# Patient Record
Sex: MALE | Race: WHITE | Employment: FULL TIME | URBAN - METROPOLITAN AREA
[De-identification: names, ages, dates, MRNs, and addresses within clinical notes are randomized per-mention and may not be internally consistent; named-entity substitution may affect disease eponyms.]

---

## 2017-01-13 DIAGNOSIS — E29.1 HYPOGONADISM MALE: ICD-10-CM

## 2017-01-13 RX ORDER — TESTOSTERONE CYPIONATE 200 MG/ML
INJECTION INTRAMUSCULAR
Qty: 1 ML | Refills: 3 | Status: SHIPPED | OUTPATIENT
Start: 2017-01-13 | End: 2017-04-21 | Stop reason: SDUPTHER

## 2017-01-13 NOTE — TELEPHONE ENCOUNTER
Left patient message RX is ready for  Monday - Friday 8 am - 4:45pm.  When patient comes he needs a follow up for his CPE and hypogonadism.

## 2017-04-21 ENCOUNTER — OFFICE VISIT (OUTPATIENT)
Dept: FAMILY MEDICINE CLINIC | Age: 62
End: 2017-04-21

## 2017-04-21 VITALS
HEIGHT: 68 IN | RESPIRATION RATE: 16 BRPM | TEMPERATURE: 98 F | HEART RATE: 68 BPM | OXYGEN SATURATION: 98 % | DIASTOLIC BLOOD PRESSURE: 80 MMHG | SYSTOLIC BLOOD PRESSURE: 140 MMHG | BODY MASS INDEX: 31.52 KG/M2 | WEIGHT: 208 LBS

## 2017-04-21 DIAGNOSIS — E78.5 HYPERLIPIDEMIA, UNSPECIFIED HYPERLIPIDEMIA TYPE: ICD-10-CM

## 2017-04-21 DIAGNOSIS — E29.1 HYPOGONADISM MALE: ICD-10-CM

## 2017-04-21 DIAGNOSIS — Z00.00 ROUTINE MEDICAL EXAM: Primary | ICD-10-CM

## 2017-04-21 DIAGNOSIS — M79.10 MYALGIA: ICD-10-CM

## 2017-04-21 RX ORDER — ROSUVASTATIN CALCIUM 20 MG/1
20 TABLET, COATED ORAL
Qty: 90 TAB | Refills: 1 | Status: SHIPPED | OUTPATIENT
Start: 2017-04-21 | End: 2018-01-08

## 2017-04-21 RX ORDER — TESTOSTERONE CYPIONATE 200 MG/ML
INJECTION INTRAMUSCULAR
Qty: 1 ML | Refills: 5 | Status: SHIPPED | OUTPATIENT
Start: 2017-04-21 | End: 2017-05-16 | Stop reason: SDUPTHER

## 2017-04-21 NOTE — PROGRESS NOTES
HISTORY OF PRESENT ILLNESS    Bebe Hogan is a 63y.o. year old male here today for:  HLD, testosterone    Had a lot of issues with muscle aches couple months ago so stopped zocor and resolved. Testosterone doing great w/ current Tx and pleased with results. Current Outpatient Prescriptions   Medication Sig Dispense Refill    testosterone cypionate (DEPOTESTOTERONE CYPIONATE) 200 mg/mL injection INJECT 200 MG IN THE MUSCLE EVERY 4 WEEKS 1 mL 3    Syringe, Disposable, 1 mL syrg Inject testosterone as directed. 100 Syringe 1    Needle, Disp, 22 G 22 x 1 1/2 \" ndle Use as directed to inject testosterone. 100 Each 1    Safety Needles (NEEDLE, DISP, 18 G) 18 x 1 1/2 \" ndle Use as directed to inject testosterone. 100 Each 1    omeprazole (PRILOSEC OTC) 20 mg tablet Take 40 mg by mouth daily. Past Medical History:   Diagnosis Date    ACL (anterior cruciate ligament) tear 8/10/2010    Carpal tunnel syndrome, bilateral 5/13/2015    GERD (gastroesophageal reflux disease) 8/10/2010    HLD (hyperlipidemia) 8/10/2010    Knee pain, right 8/10/2010    [facility] 12/13/2011    face     Social History     Social History    Marital status:      Spouse name: N/A    Number of children: N/A    Years of education: N/A     Social History Main Topics    Smoking status: Current Some Day Smoker     Types: Cigars    Smokeless tobacco: Never Used    Alcohol use 8.4 oz/week     14 Cans of beer per week    Drug use: No    Sexual activity: Yes     Partners: Female     Birth control/ protection: None     Other Topics Concern    None     Social History Narrative     Family History   Problem Relation Age of Onset    High Cholesterol Mother     High Cholesterol Father    [place] Arthritis-rheumatoid Father     Heart Disease Father      CAD    Crohn's Disease Sister     Cancer Father      prostate in [de-identified]       ROS:  Se HPI. Good libido and energy.     Objective:  Visit Vitals    /80 (BP 1 Location: Left arm, BP Patient Position: Sitting)    Pulse 68    Temp 98 °F (36.7 °C) (Oral)    Resp 16    Ht 5' 8\" (1.727 m)    Wt 208 lb (94.3 kg)    SpO2 98%    BMI 31.63 kg/m2     GEN:  Appears stated age in NAD. HEENT: Conjunctiva/lids normal.  External ears and nose without lesions/trauma. Hearing Intact. Tongue midline. NECK: Trachea midline. Supple. Full ROM  CARDIAC:  regular rate and rhythm. no Murmur, no peripheral edema. LUNGS: lungs clear to auscultation, no accessory muscle use. MS: no clubbing/cyanosis. SKIN: Warm/dry without rash. PSYCH: Appropriate insight, Judgment. A&O x 3. Assessment/Plan:   HLD  Hypogonadism  Myalgia    Patient verbalizes understanding of evaluation and plan. Will get labs in a month and I will notify results for change to crestor. Start CoQ 10. Continue testosterone every 4 weeks.

## 2017-04-21 NOTE — PROGRESS NOTES
Patient is currently not taking the following medications and wants them removed from their list:    no    Learning Assessment (baseline): complete  Depression Screening: complete      1. Have you been to the ER, urgent care clinic since your last visit? Hospitalized since your last visit? No    2. Have you seen or consulted any other health care providers outside of the [address] [place] [address] since your last visit? Include any pap smears or colon screening.  Yes derm      Patient is due for the following immunizations:    Influenza: completed

## 2017-04-21 NOTE — MR AVS SNAPSHOT
Visit Information Date & Time Provider Department Dept. Phone Encounter #  
 4/21/2017 10:40 AM Yany Patel, [address]  [id number] Follow-up Instructions Return in about 6 months (around 10/21/2017) for testosterone, cholesterol. Your Appointments 8/1/2017 10:00 AM  
Office Visit with Albina Alba MD  
[facility]) Appt Note: 1 yr; 1 yr  
 [address] [facility], [address] Upcoming Health Maintenance Date Due Hepatitis C Screening 1955 FOBT Q 1 YEAR AGE 50-75 12/28/2007 INFLUENZA AGE 9 TO ADULT 8/1/2016 Pneumococcal 19-64 Medium Risk (1 of 1 - PPSV23) 11/21/2017* DTaP/Tdap/Td series (2 - Td) 10/16/2018 *Topic was postponed. The date shown is not the original due date. Allergies as of 4/21/2017  Review Complete On: 4/21/2017 By: Yany Patel DO Severity Noted Reaction Type Reactions Zocor [Simvastatin]  04/21/2017    Myalgia Current Immunizations  Reviewed on 12/14/2010 Name Date Influenza Vaccine PF 12/11/2013 Influenza Vaccine Split 12/14/2010 TDAP Vaccine 10/16/2008 Not reviewed this visit You Were Diagnosed With   
  
 Codes Comments Routine medical exam    -  Primary ICD-10-CM: Z00.00 ICD-9-CM: V70.0 Hypogonadism male     ICD-10-CM: E29.1 ICD-9-CM: 257.2 Hyperlipidemia, unspecified hyperlipidemia type     ICD-10-CM: E78.5 ICD-9-CM: 272.4 Myalgia     ICD-10-CM: M79.1 ICD-9-CM: 729.1 Vitals BP Pulse Temp Resp Height(growth percentile) Weight(growth percentile) 140/80 (BP 1 Location: Left arm, BP Patient Position: Sitting) 68 98 °F (36.7 °C) (Oral) 16 5' 8\" (1.727 m) 208 lb (94.3 kg) SpO2 BMI Smoking Status 98% 31.63 kg/m2 Current Some Day Smoker BMI and BSA Data Body Mass Index Body Surface Area  
 31.63 kg/m 2 2.13 m 2 Preferred Pharmacy Pharmacy Name Phone [facility] [id number] [place], [address] [phone] Your Updated Medication List  
  
   
This list is accurate as of: 4/21/17 11:08 AM.  Always use your most recent med list.  
  
  
  
  
 Needle (Disp) 22 G 22 gauge x 1 1/2\" Ndle Use as directed to inject testosterone. PriLOSEC OTC 20 mg tablet Generic drug:  omeprazole Take 40 mg by mouth daily. rosuvastatin 20 mg tablet Commonly known as:  CRESTOR Take 1 Tab by mouth nightly. Safety Needles 18 gauge x 1 1/2\" Ndle Use as directed to inject testosterone. Syringe (Disposable) 1 mL Syrg Inject testosterone as directed. testosterone cypionate 200 mg/mL injection Commonly known as:  DEPOTESTOTERONE CYPIONATE INJECT 200 MG IN THE MUSCLE EVERY 4 WEEKS Prescriptions Printed Refills  
 testosterone cypionate (DEPOTESTOTERONE CYPIONATE) 200 mg/mL injection 5 Sig: INJECT 200 MG IN THE MUSCLE EVERY 4 WEEKS Class: Print Prescriptions Sent to Pharmacy Refills  
 rosuvastatin (CRESTOR) 20 mg tablet 1 Sig: Take 1 Tab by mouth nightly. Class: Normal  
 Pharmacy: [facility] [address] [place] [address] AT [address]  #: [phone] Route: Oral  
  
Follow-up Instructions Return in about 6 months (around 10/21/2017) for testosterone, cholesterol. To-Do List   
 05/05/2017 Lab:  TESTOSTERONE, TOTAL, ADULT MALE Around 07/20/2017 Lab:  LIPID PANEL Around 07/20/2017 Lab:  METABOLIC PANEL, COMPREHENSIVE Patient Instructions Start Coenzyme Q10 (coQ 10) 100mg once or twice daily over the counter. Hypogonadism: Care Instructions Your Care Instructions Men who have hypogonadism do not make enough testosterone.  This hormone allows men to make sperm and to have normal physical male traits. The condition also is known as testosterone deficiency. It can lead to loss of sex drive, weakness, impotence, infertility, and weakened bones. Many things can cause this condition. Causes include injured testicles, certain medicines, an infection, and aging. Having a long-term health problem such as kidney or liver disease or being obese can cause it. So can surgery or radiation treatment for another health problem. It also can be present at birth. It is most often treated with testosterone hormone. You can get the hormone as a shot or through a patch or gel on the skin. Follow-up care is a key part of your treatment and safety. Be sure to make and go to all appointments, and call your doctor if you are having problems. It's also a good idea to know your test results and keep a list of the medicines you take. How can you care for yourself at home? · Take your medicines exactly as prescribed. Call your doctor if you think you are having a problem with your medicine. You will get more details on the specific medicines your doctor prescribes. · Follow your treatment plan. If you use testosterone hormones, follow your doctor's instructions. Hormones can help relieve many of the effects of this condition, such as impotence. But it may take weeks or months for your symptoms to improve. · Get plenty of exercise. And make sure to get plenty of calcium and vitamin D in your diet. Eat more dairy foods and green vegetables. They can help keep your bones from getting weak. · If you have a hard time dealing with this condition, talk to your doctor about joining a support group. Talking with others who have the same problems can help you cope. When should you call for help? Call your doctor now or seek immediate medical care if: 
· You have headaches. · You have problems with your vision. Watch closely for changes in your health, and be sure to contact your doctor if: 
· You have trouble getting or keeping an erection. · You have a loss of body hair. · You feel weak or tired a lot of the time. · Your breasts are getting larger. · You do not get better as expected. Where can you learn more? Go to http://yaritza-zulema.info/. Enter 99 495354 in the search box to learn more about \"Hypogonadism: Care Instructions. \" Current as of: July 28, 2016 Content Version: 11.2 © 6964-7268 MedPageToday. Care instructions adapted under license by [employer] (which disclaims liability or warranty for this information). If you have questions about a medical condition or this instruction, always ask your healthcare professional. [address] any warranty or liability for your use of this information. High Cholesterol: Care Instructions Your Care Instructions Cholesterol is a type of fat in your blood. It is needed for many body functions, such as making new cells. Cholesterol is made by your body. It also comes from food you eat. High cholesterol means that you have too much of the fat in your blood. This raises your risk of a heart attack and stroke. LDL and HDL are part of your total cholesterol. LDL is the \"bad\" cholesterol. High LDL can raise your risk for heart disease, heart attack, and stroke. HDL is the \"good\" cholesterol. It helps clear bad cholesterol from the body. High HDL is linked with a lower risk of heart disease, heart attack, and stroke. Your cholesterol levels help your doctor find out your risk for having a heart attack or stroke. You and your doctor can talk about whether you need to lower your risk and what treatment is best for you. A heart-healthy lifestyle along with medicines can help lower your cholesterol and your risk.  The way you choose to lower your risk will depend on how high your risk is for heart attack and stroke. It will also depend on how you feel about taking medicines. Follow-up care is a key part of your treatment and safety. Be sure to make and go to all appointments, and call your doctor if you are having problems. It's also a good idea to know your test results and keep a list of the medicines you take. How can you care for yourself at home? · Eat a variety of foods every day. Good choices include fruits, vegetables, whole grains (like oatmeal), dried beans and peas, nuts and seeds, soy products (like tofu), and fat-free or low-fat dairy products. · Replace butter, margarine, and hydrogenated or partially hydrogenated oils with olive and canola oils. (Canola oil margarine without trans fat is fine.) · Replace red meat with fish, poultry, and soy protein (like tofu). · Limit processed and packaged foods like chips, crackers, and cookies. · Bake, broil, or steam foods. Don't renee them. · Be physically active. Get at least 30 minutes of exercise on most days of the week. Walking is a good choice. You also may want to do other activities, such as running, swimming, cycling, or playing tennis or team sports. · Stay at a healthy weight or lose weight by making the changes in eating and physical activity listed above. Losing just a small amount of weight, even 5 to 10 pounds, can reduce your risk for having a heart attack or stroke. · Do not smoke. When should you call for help? Watch closely for changes in your health, and be sure to contact your doctor if: 
· You need help making lifestyle changes. · You have questions about your medicine. Where can you learn more? Go to http://yaritza-zulema.info/. Enter L965 in the search box to learn more about \"High Cholesterol: Care Instructions. \" Current as of: January 27, 2016 Content Version: 11.2 © 3634-6197 [employer]Game, Incorporated.  Care instructions adapted under license by [address] (which disclaims liability or warranty for this information). If you have questions about a medical condition or this instruction, always ask your healthcare professional. [address] any warranty or liability for your use of this information. Well Visit, Men 48 to 72: Care Instructions Your Care Instructions Physical exams can help you stay healthy. Your doctor has checked your overall health and may have suggested ways to take good care of yourself. He or she also may have recommended tests. At home, you can help prevent illness with healthy eating, regular exercise, and other steps. Follow-up care is a key part of your treatment and safety. Be sure to make and go to all appointments, and call your doctor if you are having problems. It's also a good idea to know your test results and keep a list of the medicines you take. How can you care for yourself at home? · Reach and stay at a healthy weight. This will lower your risk for many problems, such as obesity, diabetes, heart disease, and high blood pressure. · Get at least 30 minutes of exercise on most days of the week. Walking is a good choice. You also may want to do other activities, such as running, swimming, cycling, or playing tennis or team sports. · Do not smoke. Smoking can make health problems worse. If you need help quitting, talk to your doctor about stop-smoking programs and medicines. These can increase your chances of quitting for good. · Protect your skin from too much sun. When you're outdoors from 10 a.m. to 4 p.m., stay in the shade or cover up with clothing and a hat with a wide brim. Wear sunglasses that block UV rays. Even when it's cloudy, put broad-spectrum sunscreen (SPF 30 or higher) on any exposed skin. · See a dentist one or two times a year for checkups and to have your teeth cleaned. · Wear a seat belt in the car. · Limit alcohol to 2 drinks a day. Too much alcohol can cause health problems. Follow your doctor's advice about when to have certain tests. These tests can spot problems early. · Cholesterol. Your doctor will tell you how often to have this done based on your overall health and other things that can increase your risk for heart attack and stroke. · Blood pressure. Have your blood pressure checked during a routine doctor visit. Your doctor will tell you how often to check your blood pressure based on your age, your blood pressure results, and other factors. · Prostate exam. Talk to your doctor about whether you should have a blood test (called a PSA test) for prostate cancer. Experts disagree on whether men should have this test. Some experts recommend that you discuss the benefits and risks of the test with your doctor. · Diabetes. Ask your doctor whether you should have tests for diabetes. · Vision. Some experts recommend that you have yearly exams for glaucoma and other age-related eye problems starting at age 48. · Hearing. Tell your doctor if you notice any change in your hearing. You can have tests to find out how well you hear. · Colon cancer. You should begin tests for colon cancer at age 48. You may have one of several tests. Your doctor will tell you how often to have tests based on your age and risk. Risks include whether you already had a precancerous polyp removed from your colon or whether your parent, brother, sister, or child has had colon cancer. · Heart attack and stroke risk. At least every 4 to 6 years, you should have your risk for heart attack and stroke assessed. Your doctor uses factors such as your age, blood pressure, cholesterol, and whether you smoke or have diabetes to show what your risk for a heart attack or stroke is over the next 10 years. · Abdominal aortic aneurysm.  Ask your doctor whether you should have a test to check for an aneurysm. You may need a test if you ever smoked or if your parent, brother, sister, or child has had an aneurysm. When should you call for help? Watch closely for changes in your health, and be sure to contact your doctor if you have any problems or symptoms that concern you. Where can you learn more? Go to http://yaritza-zulema.info/. Enter L325 in the search box to learn more about \"Well Visit, Men 48 to 72: Care Instructions. \" Current as of: July 19, 2016 Content Version: 11.2 © 8684-6533 [employer]. Care instructions adapted under license by [employer] (which disclaims liability or warranty for this information). If you have questions about a medical condition or this instruction, always ask your healthcare professional. [address] any warranty or liability for your use of this information. Introducing [facility]! Dear Emili Rubin: 
Thank you for requesting a Pint Please account. Our records indicate that you already have an active Pint Please account. You can access your account anytime at [email or web address] [email or web address] Did you know that you can access your hospital and ER discharge instructions at any time in Pint Please? You can also review all of your test results from your hospital stay or ER visit. Additional Information If you have questions, please visit the Frequently Asked Questions section of the Pint Please website at [email or web address] Remember, Pint Please is NOT to be used for urgent needs. For medical emergencies, dial 911. Now available from your iPhone and Android! Please provide this summary of care documentation to your next provider. Your primary care clinician is listed as  Bad. If you have any questions after today's visit, please call [phone].

## 2017-04-21 NOTE — PROGRESS NOTES
Subjective: Ilan Del Toro is a 63 y.o. male presenting for annual exam and complete physical.    Patient Active Problem List   Diagnosis Code    GERD (gastroesophageal reflux disease) K21.9    HLD (hyperlipidemia) E78.5    ACL (anterior cruciate ligament) tear S83.519A    Knee pain, right M25.561    Hypogonadism male E29.1    Carpal tunnel syndrome, bilateral G56.03     Patient Active Problem List    Diagnosis Date Noted    Carpal tunnel syndrome, bilateral 05/13/2015    Hypogonadism male 03/06/2013    GERD (gastroesophageal reflux disease) 08/10/2010    HLD (hyperlipidemia) 08/10/2010    ACL (anterior cruciate ligament) tear 08/10/2010    Knee pain, right 08/10/2010     Current Outpatient Prescriptions   Medication Sig Dispense Refill    testosterone cypionate (DEPOTESTOTERONE CYPIONATE) 200 mg/mL injection INJECT 200 MG IN THE MUSCLE EVERY 4 WEEKS 1 mL 3    simvastatin (ZOCOR) 40 mg tablet take 1 tablet by mouth at bedtime 90 Tab 1    Syringe, Disposable, 1 mL syrg Inject testosterone as directed. 100 Syringe 1    Needle, Disp, 22 G 22 x 1 1/2 \" ndle Use as directed to inject testosterone. 100 Each 1    Safety Needles (NEEDLE, DISP, 18 G) 18 x 1 1/2 \" ndle Use as directed to inject testosterone. 100 Each 1    omeprazole (PRILOSEC OTC) 20 mg tablet Take 40 mg by mouth daily.        No Known Allergies  Past Medical History:   Diagnosis Date    ACL (anterior cruciate ligament) tear 8/10/2010    Carpal tunnel syndrome, bilateral 5/13/2015    GERD (gastroesophageal reflux disease) 8/10/2010    HLD (hyperlipidemia) 8/10/2010    Knee pain, right 8/10/2010    Squamous cell carcinoma ([facility] 12/13/2011    face     Past Surgical History:   Procedure Laterality Date    BIOPSY  2011    face - squamous cell     HX COLONOSCOPY  2008    f/u 2018    HX ORTHOPAEDIC  11or10 of 2008    right hand      Family History   Problem Relation Age of Onset    High Cholesterol Mother     High Cholesterol Father     Arthritis-rheumatoid Father     Heart Disease Father      CAD    Crohn's Disease Sister     Cancer Father      prostate in [de-identified]     Social History   Substance Use Topics    Smoking status: Current Some Day Smoker     Types: Cigars    Smokeless tobacco: Never Used    Alcohol use 8.4 oz/week     14 Cans of beer per week        Lab Results   Component Value Date/Time    Sodium 137 07/15/2016 09:47 AM    Potassium 4.6 07/15/2016 09:47 AM    Chloride 97 07/15/2016 09:47 AM    CO2 26 07/15/2016 09:47 AM    Anion gap 14.0 07/15/2016 09:47 AM    Glucose 83 07/15/2016 09:47 AM    BUN 15 07/15/2016 09:47 AM    Creatinine 0.8 07/15/2016 09:47 AM    BUN/Creatinine ratio 14 12/14/2010 10:03 AM    GFR est AA >59 12/14/2010 10:03 AM    GFR est non-AA >59 12/14/2010 10:03 AM    Calcium 9.4 07/15/2016 09:47 AM    Bilirubin, total 1.2 07/15/2016 09:47 AM    AST (SGOT) 25 07/15/2016 09:47 AM    Alk. phosphatase 60 07/15/2016 09:47 AM    Protein, total 7.1 07/15/2016 09:47 AM    Albumin 5.1 07/15/2016 09:47 AM    Globulin 2.0 07/15/2016 09:47 AM    A-G Ratio 2.6 07/15/2016 09:47 AM    ALT (SGPT) 26 07/15/2016 09:47 AM     Lab Results   Component Value Date/Time    Cholesterol, total 252 07/15/2016 09:47 AM    HDL Cholesterol 55 07/15/2016 09:47 AM    LDL, calculated 165 07/15/2016 09:47 AM    VLDL, calculated 33 07/15/2016 09:47 AM    Triglyceride 163 07/15/2016 09:47 AM    CHOL/HDL Ratio 4.6 08/10/2010 08:41 AM     No results found for: PSA, Scheryl Mad, PSAR3, [id number], [id number], PSALT      Review of Systems  A comprehensive review of systems was negative except for that written in the HPI. Objective:     Visit Vitals    /80 (BP 1 Location: Left arm, BP Patient Position: Sitting)    Pulse 68    Temp 98 °F (36.7 °C) (Oral)    Resp 16    Ht 5' 8\" (1.727 m)    Wt 208 lb (94.3 kg)    SpO2 98%    BMI 31.63 kg/m2     GEN: Appears stated age in NAD.    HEENT: Conjunctiva/lids WNL. External canals/nares WNL. Tongue midline. PERRL, EOMI. Hearing intact. NECK: Trachea midline. Supple, Full ROM. No thyroid masses. CARDIAC: RRR. S1S2. No Murmur. No peripheral edema. LUNGS: CTAB w/ normal effort. ABD: Soft, NT. No HSM. MS: No clubbing/cyanosis. Steady gait. Strength +5/5 all extremities with full ROM and good tone. NEURO: Sensation intact light touch. Good coordination. No focal deficits. SKIN: Warm/dry w/o rash. PSYCH: A+O x3. Appropriate judgment and insight. Assessment/Plan:   increase physical activity, follow low fat diet, follow low salt diet, routine labs ordered. ICD-10-CM ICD-9-CM    1. Routine medical exam Z00.00 V70.0    2. Hypogonadism male E29.1 257.2    3. Hyperlipidemia, unspecified hyperlipidemia type E78.5 272.4      Se other note E/M. Will notify labs plan draw in 4 weeks.

## 2017-04-21 NOTE — PATIENT INSTRUCTIONS
Start Coenzyme Q10 (coQ 10) 100mg once or twice daily over the counter. Hypogonadism: Care Instructions  Your Care Instructions  Men who have hypogonadism do not make enough testosterone. This hormone allows men to make sperm and to have normal physical male traits. The condition also is known as testosterone deficiency. It can lead to loss of sex drive, weakness, impotence, infertility, and weakened bones. Many things can cause this condition. Causes include injured testicles, certain medicines, an infection, and aging. Having a long-term health problem such as kidney or liver disease or being obese can cause it. So can surgery or radiation treatment for another health problem. It also can be present at birth. It is most often treated with testosterone hormone. You can get the hormone as a shot or through a patch or gel on the skin. Follow-up care is a key part of your treatment and safety. Be sure to make and go to all appointments, and call your doctor if you are having problems. It's also a good idea to know your test results and keep a list of the medicines you take. How can you care for yourself at home? · Take your medicines exactly as prescribed. Call your doctor if you think you are having a problem with your medicine. You will get more details on the specific medicines your doctor prescribes. · Follow your treatment plan. If you use testosterone hormones, follow your doctor's instructions. Hormones can help relieve many of the effects of this condition, such as impotence. But it may take weeks or months for your symptoms to improve. · Get plenty of exercise. And make sure to get plenty of calcium and vitamin D in your diet. Eat more dairy foods and green vegetables. They can help keep your bones from getting weak. · If you have a hard time dealing with this condition, talk to your doctor about joining a support group. Talking with others who have the same problems can help you cope.   When should you call for help? Call your doctor now or seek immediate medical care if:  · You have headaches. · You have problems with your vision. Watch closely for changes in your health, and be sure to contact your doctor if:  · You have trouble getting or keeping an erection. · You have a loss of body hair. · You feel weak or tired a lot of the time. · Your breasts are getting larger. · You do not get better as expected. Where can you learn more? Go to http://yaritza-zulema.info/. Enter 99 [id number] in the search box to learn more about \"Hypogonadism: Care Instructions. \"  Current as of: July 28, 2016  Content Version: 11.2  © 0740-7014 [employer]. Care instructions adapted under license by [employer] (which disclaims liability or warranty for this information). If you have questions about a medical condition or this instruction, always ask your healthcare professional. [place] [address] any warranty or liability for your use of this information. High Cholesterol: Care Instructions  Your Care Instructions  Cholesterol is a type of fat in your blood. It is needed for many body functions, such as making new cells. Cholesterol is made by your body. It also comes from food you eat. High cholesterol means that you have too much of the fat in your blood. This raises your risk of a heart attack and stroke. LDL and HDL are part of your total cholesterol. LDL is the \"bad\" cholesterol. High LDL can raise your risk for heart disease, heart attack, and stroke. HDL is the \"good\" cholesterol. It helps clear bad cholesterol from the body. High HDL is linked with a lower risk of heart disease, heart attack, and stroke. Your cholesterol levels help your doctor find out your risk for having a heart attack or stroke. You and your doctor can talk about whether you need to lower your risk and what treatment is best for you.   A heart-healthy lifestyle along with medicines can help lower your cholesterol and your risk. The way you choose to lower your risk will depend on how high your risk is for heart attack and stroke. It will also depend on how you feel about taking medicines. Follow-up care is a key part of your treatment and safety. Be sure to make and go to all appointments, and call your doctor if you are having problems. It's also a good idea to know your test results and keep a list of the medicines you take. How can you care for yourself at home? · Eat a variety of foods every day. Good choices include fruits, vegetables, whole grains (like oatmeal), dried beans and peas, nuts and seeds, soy products (like tofu), and fat-free or low-fat dairy products. · Replace butter, margarine, and hydrogenated or partially hydrogenated oils with olive and canola oils. (Canola oil margarine without trans fat is fine.)  · Replace red meat with fish, poultry, and soy protein (like tofu). · Limit processed and packaged foods like chips, crackers, and cookies. · Bake, broil, or steam foods. Don't renee them. · Be physically active. Get at least 30 minutes of exercise on most days of the week. Walking is a good choice. You also may want to do other activities, such as running, swimming, cycling, or playing tennis or team sports. · Stay at a healthy weight or lose weight by making the changes in eating and physical activity listed above. Losing just a small amount of weight, even 5 to 10 pounds, can reduce your risk for having a heart attack or stroke. · Do not smoke. When should you call for help? Watch closely for changes in your health, and be sure to contact your doctor if:  · You need help making lifestyle changes. · You have questions about your medicine. Where can you learn more? Go to http://yaritza-zulema.info/. Enter V378 in the search box to learn more about \"High Cholesterol: Care Instructions. \"  Current as of: January 27, 2016  Content Version: 11.2  © 6877-6508 [employer]. Care instructions adapted under license by [employer] (which disclaims liability or warranty for this information). If you have questions about a medical condition or this instruction, always ask your healthcare professional. [place] any warranty or liability for your use of this information. Well Visit, Men 48 to 72: Care Instructions  Your Care Instructions  Physical exams can help you stay healthy. Your doctor has checked your overall health and may have suggested ways to take good care of yourself. He or she also may have recommended tests. At home, you can help prevent illness with healthy eating, regular exercise, and other steps. Follow-up care is a key part of your treatment and safety. Be sure to make and go to all appointments, and call your doctor if you are having problems. It's also a good idea to know your test results and keep a list of the medicines you take. How can you care for yourself at home? · Reach and stay at a healthy weight. This will lower your risk for many problems, such as obesity, diabetes, heart disease, and high blood pressure. · Get at least 30 minutes of exercise on most days of the week. Walking is a good choice. You also may want to do other activities, such as running, swimming, cycling, or playing tennis or team sports. · Do not smoke. Smoking can make health problems worse. If you need help quitting, talk to your doctor about stop-smoking programs and medicines. These can increase your chances of quitting for good. · Protect your skin from too much sun. When you're outdoors from 10 a.m. to 4 p.m., stay in the shade or cover up with clothing and a hat with a wide brim. Wear sunglasses that block UV rays. Even when it's cloudy, put broad-spectrum sunscreen (SPF 30 or higher) on any exposed skin.   · See a dentist one or two times a year for checkups and to have your teeth cleaned. · Wear a seat belt in the car. · Limit alcohol to 2 drinks a day. Too much alcohol can cause health problems. Follow your doctor's advice about when to have certain tests. These tests can spot problems early. · Cholesterol. Your doctor will tell you how often to have this done based on your overall health and other things that can increase your risk for heart attack and stroke. · Blood pressure. Have your blood pressure checked during a routine doctor visit. Your doctor will tell you how often to check your blood pressure based on your age, your blood pressure results, and other factors. · Prostate exam. Talk to your doctor about whether you should have a blood test (called a PSA test) for prostate cancer. Experts disagree on whether men should have this test. Some experts recommend that you discuss the benefits and risks of the test with your doctor. · Diabetes. Ask your doctor whether you should have tests for diabetes. · Vision. Some experts recommend that you have yearly exams for glaucoma and other age-related eye problems starting at age 48. · Hearing. Tell your doctor if you notice any change in your hearing. You can have tests to find out how well you hear. · Colon cancer. You should begin tests for colon cancer at age 48. You may have one of several tests. Your doctor will tell you how often to have tests based on your age and risk. Risks include whether you already had a precancerous polyp removed from your colon or whether your parent, brother, sister, or child has had colon cancer. · Heart attack and stroke risk. At least every 4 to 6 years, you should have your risk for heart attack and stroke assessed. Your doctor uses factors such as your age, blood pressure, cholesterol, and whether you smoke or have diabetes to show what your risk for a heart attack or stroke is over the next 10 years. · Abdominal aortic aneurysm.  Ask your doctor whether you should have a test to check for an aneurysm. You may need a test if you ever smoked or if your parent, brother, sister, or child has had an aneurysm. When should you call for help? Watch closely for changes in your health, and be sure to contact your doctor if you have any problems or symptoms that concern you. Where can you learn more? Go to http://yaritza-zulema.info/. Enter A050 in the search box to learn more about \"Well Visit, Men 48 to 72: Care Instructions. \"  Current as of: July 19, 2016  Content Version: 11.2  © 0516-9086 [employer]. Care instructions adapted under license by [employer] (which disclaims liability or warranty for this information). If you have questions about a medical condition or this instruction, always ask your healthcare professional. [address] any warranty or liability for your use of this information.

## 2017-05-05 DIAGNOSIS — E29.1 HYPOGONADISM MALE: ICD-10-CM

## 2017-05-17 ENCOUNTER — TELEPHONE (OUTPATIENT)
Dept: FAMILY MEDICINE CLINIC | Age: 62
End: 2017-05-17

## 2017-06-19 DIAGNOSIS — E29.1 HYPOGONADISM MALE: ICD-10-CM

## 2017-06-19 RX ORDER — TESTOSTERONE CYPIONATE 200 MG/ML
INJECTION INTRAMUSCULAR
Qty: 1 ML | Refills: 5 | Status: SHIPPED | OUTPATIENT
Start: 2017-06-19 | End: 2017-07-19 | Stop reason: SDUPTHER

## 2017-07-19 DIAGNOSIS — E29.1 HYPOGONADISM MALE: ICD-10-CM

## 2017-07-20 DIAGNOSIS — E78.5 HYPERLIPIDEMIA, UNSPECIFIED HYPERLIPIDEMIA TYPE: ICD-10-CM

## 2017-07-24 RX ORDER — TESTOSTERONE CYPIONATE 200 MG/ML
INJECTION INTRAMUSCULAR
Qty: 1 ML | Refills: 5 | Status: SHIPPED | OUTPATIENT
Start: 2017-07-24 | End: 2018-01-03 | Stop reason: SDUPTHER

## 2017-07-26 LAB
A-G RATIO,AGRAT: 2.2 RATIO (ref 1.1–2.6)
ALBUMIN SERPL-MCNC: 4.7 G/DL (ref 3.5–5)
ALP SERPL-CCNC: 61 U/L (ref 25–115)
ALT SERPL-CCNC: 38 U/L (ref 5–40)
ANION GAP SERPL CALC-SCNC: 22 MMOL/L
AST SERPL W P-5'-P-CCNC: 32 U/L (ref 10–37)
BILIRUB SERPL-MCNC: 1.1 MG/DL (ref 0.2–1.2)
BUN SERPL-MCNC: 14 MG/DL (ref 6–22)
CALCIUM SERPL-MCNC: 9.1 MG/DL (ref 8.4–10.4)
CHLORIDE SERPL-SCNC: 88 MMOL/L (ref 98–110)
CHOLEST SERPL-MCNC: 261 MG/DL (ref 110–200)
CO2 SERPL-SCNC: 21 MMOL/L (ref 20–32)
CREAT SERPL-MCNC: 1.1 MG/DL (ref 0.5–1.2)
GFRAA, 66117: >60
GFRNA, 66118: >60
GLOBULIN,GLOB: 2.1 G/DL (ref 2–4)
GLUCOSE SERPL-MCNC: 84 MG/DL (ref 65–99)
HDLC SERPL-MCNC: 47 MG/DL (ref 40–59)
LDLC SERPL CALC-MCNC: 152 MG/DL (ref 50–99)
POTASSIUM SERPL-SCNC: 4.5 MMOL/L (ref 3.5–5.5)
PROT SERPL-MCNC: 6.8 G/DL (ref 6.4–8.3)
SODIUM SERPL-SCNC: 131 MMOL/L (ref 133–145)
TESTOSTERONE, SERUM (TOTAL) 500912: 144 NG/DL (ref 348–1197)
TRIGL SERPL-MCNC: 307 MG/DL (ref 40–149)
VLDLC SERPL CALC-MCNC: 61 MG/DL (ref 8–30)

## 2018-01-02 DIAGNOSIS — E29.1 HYPOGONADISM MALE: ICD-10-CM

## 2018-01-02 DIAGNOSIS — E29.1 HYPOGONADISM MALE: Primary | ICD-10-CM

## 2018-01-02 RX ORDER — TESTOSTERONE CYPIONATE 200 MG/ML
INJECTION INTRAMUSCULAR
Qty: 1 ML | Refills: 5 | OUTPATIENT
Start: 2018-01-02

## 2018-01-02 NOTE — TELEPHONE ENCOUNTER
Requested Prescriptions     Pending Prescriptions Disp Refills    testosterone cypionate (DEPOTESTOTERONE CYPIONATE) 200 mg/mL injection 1 mL 5     Sig: INJECT 200 MG IN THE MUSCLE EVERY 4 WEEKS      F/u scheduled for 01/08/2018 with Uziel Phan

## 2018-01-02 NOTE — TELEPHONE ENCOUNTER
Spoke with patient informing him that no one in office prescribed testosterone and he will be sent to endocrinology.  Patient would like the referral.

## 2018-01-03 ENCOUNTER — TELEPHONE (OUTPATIENT)
Dept: ORTHOPEDIC SURGERY | Age: 63
End: 2018-01-03

## 2018-01-03 DIAGNOSIS — E29.1 HYPOGONADISM MALE: ICD-10-CM

## 2018-01-03 RX ORDER — TESTOSTERONE CYPIONATE 200 MG/ML
INJECTION INTRAMUSCULAR
Qty: 3 ML | Refills: 1 | OUTPATIENT
Start: 2018-01-03

## 2018-01-03 NOTE — PROGRESS NOTES
I received a message the patient needs a refill of testosterone cypionate to allow him to continue treatment until his appointment with the endocrinologist in June. I sent in 3 mL with a refill to allow a six-month supply, so he can have the medication until his appointment in June.

## 2018-01-08 ENCOUNTER — OFFICE VISIT (OUTPATIENT)
Dept: FAMILY MEDICINE CLINIC | Age: 63
End: 2018-01-08

## 2018-01-08 VITALS
OXYGEN SATURATION: 97 % | HEIGHT: 68 IN | HEART RATE: 75 BPM | SYSTOLIC BLOOD PRESSURE: 168 MMHG | WEIGHT: 223 LBS | RESPIRATION RATE: 16 BRPM | TEMPERATURE: 97.4 F | BODY MASS INDEX: 33.8 KG/M2 | DIASTOLIC BLOOD PRESSURE: 112 MMHG

## 2018-01-08 DIAGNOSIS — Z11.59 ENCOUNTER FOR HEPATITIS C SCREENING TEST FOR LOW RISK PATIENT: ICD-10-CM

## 2018-01-08 DIAGNOSIS — R03.0 ELEVATED BLOOD PRESSURE READING: ICD-10-CM

## 2018-01-08 DIAGNOSIS — E29.1 HYPOGONADISM MALE: ICD-10-CM

## 2018-01-08 DIAGNOSIS — Z23 ENCOUNTER FOR IMMUNIZATION: ICD-10-CM

## 2018-01-08 DIAGNOSIS — E78.5 HYPERLIPIDEMIA, UNSPECIFIED HYPERLIPIDEMIA TYPE: Primary | ICD-10-CM

## 2018-01-08 NOTE — MR AVS SNAPSHOT
Visit Information Date & Time Provider Department Dept. Phone Encounter #  
 1/8/2018  3:15 PM Kimberley Doe NP [facility] [phone] [id number] Upcoming Health Maintenance Date Due Hepatitis C Screening 1955 FOBT Q 1 YEAR AGE 50-75 12/28/2007 Influenza Age 5 to Adult 8/1/2017 ZOSTER VACCINE AGE 60> 10/28/2017 DTaP/Tdap/Td series (2 - Td) 10/16/2018 Allergies as of 1/8/2018  Review Complete On: 1/8/2018 By: Kimberley Doe NP Severity Noted Reaction Type Reactions Zocor [Simvastatin]  04/21/2017    Myalgia Current Immunizations  Reviewed on 1/8/2018 Name Date Influenza Vaccine PF 12/11/2013 Influenza Vaccine Split 12/14/2010 TDAP Vaccine 10/16/2008 Reviewed by Kimberley Doe NP on 1/8/2018 at  3:29 PM  
You Were Diagnosed With   
  
 Codes Comments Hyperlipidemia, unspecified hyperlipidemia type    -  Primary ICD-10-CM: E78.5 ICD-9-CM: 272.4 Elevated blood pressure reading     ICD-10-CM: R03.0 ICD-9-CM: 796.2 Encounter for immunization     ICD-10-CM: P95 ICD-9-CM: V03.89 Vitals BP Pulse Temp Resp Height(growth percentile) Weight(growth percentile) (!) 168/112 (BP 1 Location: Left arm, BP Patient Position: Sitting) 75 97.4 °F (36.3 °C) (Oral) 16 5' 8\" (1.727 m) 223 lb (101.2 kg) SpO2 BMI Smoking Status 97% 33.91 kg/m2 Current Some Day Smoker Vitals History BMI and BSA Data Body Mass Index Body Surface Area  
 33.91 kg/m 2 2.2 m 2 Preferred Pharmacy Pharmacy Name Phone [phone] [facility] [address], [address] [phone] Your Updated Medication List  
  
   
This list is accurate as of: 1/8/18  4:15 PM.  Always use your most recent med list.  
  
  
  
  
 PriLOSEC OTC 20 mg tablet Generic drug:  omeprazole Take 40 mg by mouth daily. Safety Needles 18 gauge x 1 1/2\" Ndle Use as directed to inject testosterone. Syringe (Disposable) 1 mL Syrg Inject testosterone as directed. testosterone cypionate 200 mg/mL injection Commonly known as:  DEPOTESTOTERONE CYPIONATE INJECT 200 MG IN THE MUSCLE EVERY 4 WEEKS  
  
 varicella zoster vaccine live 19,400 unit/0.65 mL Susr injection Commonly known as:  ZOSTAVAX  
1 Vial by SubCUTAneous route once for 1 dose. Prescriptions Printed Refills  
 varicella zoster vaccine live (ZOSTAVAX) 19,400 unit/0.65 mL susr injection 0 Si Vial by SubCUTAneous route once for 1 dose. Class: Print Route: SubCUTAneous To-Do List   
 2018 Lab:  LIPID PANEL   
  
 2018 Lab:  METABOLIC PANEL, COMPREHENSIVE Patient Instructions Monitor blood pressure: consider :Losartan 100/25 Introducing [facility]! Dear Ruchi Rosenberg: 
Thank you for requesting a Vionic account. Our records indicate that you already have an active Vionic account. You can access your account anytime at [email or web address] Did you know that you can access your hospital and ER discharge instructions at any time in Vionic? You can also review all of your test results from your hospital stay or ER visit. Additional Information If you have questions, please visit the Frequently Asked Questions section of the Vionic website at [email or web address] Remember, Vionic is NOT to be used for urgent needs. For medical emergencies, dial 911. Now available from your iPhone and Android! Please provide this summary of care documentation to your next provider. Your primary care clinician is listed as Tracy Maxwell. If you have any questions after today's visit, please call [phone].

## 2018-01-08 NOTE — PROGRESS NOTES
HISTORY OF PRESENT ILLNESS  Peter Hicks is a 63 y.o. male. Patient presents today regarding Testosterone. HPI  Pt states he wants an explanation as to why he is being referred to endocrinology for testosterone management when it has been done her for years. Pt stopped Crestor due to cost.  Pt states he has never had a problem with his blood pressure before. Review of Systems   Constitutional: Negative. Respiratory: Negative. Cardiovascular: Negative. Genitourinary: Negative. Visit Vitals    BP (!) 168/103 (BP 1 Location: Left arm, BP Patient Position: Sitting)    Pulse 75    Temp 97.4 °F (36.3 °C) (Oral)    Resp 16    Ht 5' 8\" (1.727 m)    Wt 223 lb (101.2 kg)    SpO2 97%    BMI 33.91 kg/m2       Physical Exam   Constitutional: He appears well-developed. No distress. Cardiovascular: Normal rate, regular rhythm and normal heart sounds. No murmur heard. Pulmonary/Chest: Effort normal and breath sounds normal. No respiratory distress. He has no wheezes. He has no rales. ASSESSMENT and PLAN    ICD-10-CM ICD-9-CM    1. Hyperlipidemia, unspecified hyperlipidemia type C96.2 490.0 METABOLIC PANEL, COMPREHENSIVE      LIPID PANEL      METABOLIC PANEL, COMPREHENSIVE      LIPID PANEL   2. Elevated blood pressure reading R03.0 796.2    3. Encounter for immunization Z23 V03.89 varicella zoster vaccine live (ZOSTAVAX) 19,400 unit/0.65 mL susr injection   4. Encounter for hepatitis C screening test for low risk patient Z11.59 V73.89 HCV RNA CAIN QUALITATIVE      HCV RNA CAIN QUALITATIVE   5. Hypogonadism male E29.1 257.2 TESTOSTERONE, FREE & TOTAL      TESTOSTERONE, FREE & TOTAL     PLAN:  I spent greater than 50 minutes face to face discussion reviewing his labs, risk and benefits of testosterone replacement therapy. I could not explain to patient why female testosterone levels were the one being ordered. He asked me to order the original testosterone levels.  We discussed his blood pressure and the risk of that not being controlled and that it is possible that the testosterone is driving it up. We also discussed his not taking his cholesterol modifications, the risk and benefits of those. He has some Simvastatin at home. We discussed immunization recommendations and Pt agreed to the shingles vaccine so script was given to him. We discussed screening recommendations and patient agreed to the Hep C screening. Pt is to monitor his blood pressure. We talked about Losartan 100/25mg, starting this to control his BP. Pt was asked to RTC in 6 mos for chronic care and fasting labs.     Pt given after visit summary

## 2018-01-08 NOTE — PROGRESS NOTES
1. Have you been to the ER, urgent care clinic since your last visit? Hospitalized since your last visit? No    2. Have you seen or consulted any other health care providers outside of the [address] [place] [address] since your last visit? Include any pap smears or colon screening.  No

## 2018-01-09 LAB
A-G RATIO,AGRAT: 1.6 RATIO (ref 1.1–2.6)
ALBUMIN SERPL-MCNC: 4.6 G/DL (ref 3.5–5)
ALP SERPL-CCNC: 62 U/L (ref 40–125)
ALT SERPL-CCNC: 43 U/L (ref 5–40)
ANION GAP SERPL CALC-SCNC: 17 MMOL/L
AST SERPL W P-5'-P-CCNC: 33 U/L (ref 10–37)
BILIRUB SERPL-MCNC: 0.9 MG/DL (ref 0.2–1.2)
BUN SERPL-MCNC: 15 MG/DL (ref 6–22)
CALCIUM SERPL-MCNC: 9.2 MG/DL (ref 8.4–10.4)
CHLORIDE SERPL-SCNC: 96 MMOL/L (ref 98–110)
CHOLEST SERPL-MCNC: 298 MG/DL (ref 110–200)
CO2 SERPL-SCNC: 26 MMOL/L (ref 20–32)
CREAT SERPL-MCNC: 0.9 MG/DL (ref 0.8–1.6)
GFRAA, 66117: >60
GFRNA, 66118: >60
GLOBULIN,GLOB: 2.8 G/DL (ref 2–4)
GLUCOSE SERPL-MCNC: 86 MG/DL (ref 70–99)
HDLC SERPL-MCNC: 50 MG/DL (ref 40–59)
LDL, DIRECT,DLDL: 183 MG/DL (ref 50–99)
LDLC SERPL CALC-MCNC: ABNORMAL MG/DL (ref 50–99)
POTASSIUM SERPL-SCNC: 4.5 MMOL/L (ref 3.5–5.5)
PROT SERPL-MCNC: 7.4 G/DL (ref 6.2–8.1)
SODIUM SERPL-SCNC: 139 MMOL/L (ref 133–145)
TRIGL SERPL-MCNC: 415 MG/DL (ref 40–149)
VLDLC SERPL CALC-MCNC: 83 MG/DL (ref 8–30)

## 2018-01-09 NOTE — PROGRESS NOTES
Please advise Pt that his kidney and liver functions are fine. His triglycerides are way too high at 415, he needs to work on his diet, eat more foods rich in Omega 3 and Niacin, walk more, eat less foods that are fried and fatty. I would recommend that he restart his cholesterol medication. We should recheck these again in 6 months, fasting.

## 2018-01-10 ENCOUNTER — TELEPHONE (OUTPATIENT)
Dept: FAMILY MEDICINE CLINIC | Age: 63
End: 2018-01-10

## 2018-01-10 NOTE — TELEPHONE ENCOUNTER
----- Message from Anoop Rojas NP sent at 1/9/2018  4:34 PM EST -----  Please advise Pt that his kidney and liver functions are fine. His triglycerides are way too high at 415, he needs to work on his diet, eat more foods rich in Omega 3 and Niacin, walk more, eat less foods that are fried and fatty. I would recommend that he restart his cholesterol medication. We should recheck these again in 6 months, fasting.

## 2018-01-10 NOTE — TELEPHONE ENCOUNTER
Spoke with patient informing him of labs reviewed by samantha. I let him know to call and give us his weekly bp log. Patient stated that Claritza Constantindanna would try to expedite his endocrine referral. I informed him that I would talk with referral coordinator and see what can be done. I called him back [facility] informing him that all paperwork was sent to eileen office and if he would like to go ahead and schedule or call everyday to see if he can be seen earlier he could. I also let him know that he can pick a new endo doc and give us a call so we can put in referral to one he would like to see.

## 2018-01-11 DIAGNOSIS — E78.2 MIXED HYPERLIPIDEMIA: Primary | ICD-10-CM

## 2018-01-11 LAB — HCV RNA QUAL PCR,9951751: NEGATIVE

## 2018-01-11 RX ORDER — ATORVASTATIN CALCIUM 20 MG/1
20 TABLET, FILM COATED ORAL DAILY
Qty: 90 TAB | Refills: 1 | Status: SHIPPED | OUTPATIENT
Start: 2018-01-11 | End: 2018-03-12

## 2018-01-12 LAB
% FREE TESTOSTERONE: 3.52 %
FREE TESTOSTERONE,DIRECT, 144981: 4.58 NG/DL
TESTOSTERONE: 130 NG/DL

## 2018-03-09 ENCOUNTER — TELEPHONE (OUTPATIENT)
Dept: FAMILY MEDICINE CLINIC | Age: 63
End: 2018-03-09

## 2018-03-09 DIAGNOSIS — I10 ESSENTIAL HYPERTENSION: Primary | ICD-10-CM

## 2018-03-09 RX ORDER — LOSARTAN POTASSIUM AND HYDROCHLOROTHIAZIDE 25; 100 MG/1; MG/1
1 TABLET ORAL DAILY
Qty: 30 TAB | Refills: 0 | Status: SHIPPED | OUTPATIENT
Start: 2018-03-09 | End: 2018-03-09 | Stop reason: SDUPTHER

## 2018-03-09 RX ORDER — LOSARTAN POTASSIUM AND HYDROCHLOROTHIAZIDE 25; 100 MG/1; MG/1
1 TABLET ORAL DAILY
Qty: 30 TAB | Refills: 0 | Status: SHIPPED | OUTPATIENT
Start: 2018-03-09 | End: 2018-04-04 | Stop reason: SDUPTHER

## 2018-03-09 NOTE — TELEPHONE ENCOUNTER
Spoke with patients wife (emergency contact) and she requested we send something in for the weekend, patient has an appointment Monday and agreed to take patient to hospital if symptoms worsen.

## 2018-03-09 NOTE — TELEPHONE ENCOUNTER
Patient's wife called in to report patient's blood pressure of 210/107 last night. Patient's wife states he is reporting symptoms of not feeling well with non-descriptive symptoms. Patient's wife said patient is currently at work. Advised patient's wife to have him go to the emergency room. Patient's wife got patient on his cell phone while I was on the line. Patient's wife relayed message to patient. Patient refusing to go to the emergency room at this time. Patient states his blood pressure this morning was 135/91. Patient's wife advised to continue monitoring patient's blood pressure, and if it elevates that high again with relatable symptoms, present to the emergency room for evaluation.

## 2018-03-12 ENCOUNTER — OFFICE VISIT (OUTPATIENT)
Dept: FAMILY MEDICINE CLINIC | Age: 63
End: 2018-03-12

## 2018-03-12 VITALS
DIASTOLIC BLOOD PRESSURE: 90 MMHG | TEMPERATURE: 97.9 F | HEIGHT: 68 IN | BODY MASS INDEX: 33.95 KG/M2 | HEART RATE: 77 BPM | WEIGHT: 224 LBS | RESPIRATION RATE: 16 BRPM | OXYGEN SATURATION: 95 % | SYSTOLIC BLOOD PRESSURE: 158 MMHG

## 2018-03-12 DIAGNOSIS — E78.00 PURE HYPERCHOLESTEROLEMIA: Primary | ICD-10-CM

## 2018-03-12 DIAGNOSIS — I10 ESSENTIAL HYPERTENSION: ICD-10-CM

## 2018-03-12 RX ORDER — SIMVASTATIN 40 MG/1
40 TABLET, FILM COATED ORAL
COMMUNITY
End: 2018-03-12

## 2018-03-12 RX ORDER — ASPIRIN 81 MG/1
TABLET ORAL DAILY
COMMUNITY
End: 2019-06-17

## 2018-03-12 RX ORDER — SIMVASTATIN 40 MG/1
TABLET, FILM COATED ORAL
Qty: 90 TAB | Refills: 1 | Status: SHIPPED | OUTPATIENT
Start: 2018-03-12 | End: 2019-01-18 | Stop reason: SDUPTHER

## 2018-03-12 NOTE — MR AVS SNAPSHOT
[facility] 
 
 
 [address] [address] 
[phone] Patient: Luma Wahl 
MRN:  [id number] Visit Information Date & Time Provider Department Dept. Phone Encounter #  
 3/12/2018 12:40 PM Perla Cr, [address] [phone] [id number] Follow-up Instructions Return in about 3 months (around 2018) for htn. Upcoming Health Maintenance Date Due FOBT Q 1 YEAR AGE 50-75 2007 Influenza Age 5 to Adult 2017 DTaP/Tdap/Td series (2 - Td) 10/16/2018 Allergies as of 3/12/2018  Review Complete On: 3/12/2018 By: Perla Cr MD  
  
 Severity Noted Reaction Type Reactions Zocor [Simvastatin]  2017    Myalgia Current Immunizations  Reviewed on 2018 Name Date Influenza Vaccine PF 2013 Influenza Vaccine Split 2010 TDAP Vaccine 10/16/2008 Not reviewed this visit You Were Diagnosed With   
  
 Codes Comments Pure hypercholesterolemia    -  Primary ICD-10-CM: E78.00 ICD-9-CM: 272.0 Essential hypertension     ICD-10-CM: I10 
ICD-9-CM: 401.9 Vitals BP Pulse Temp Resp Height(growth percentile) Weight(growth percentile) 158/90 77 97.9 °F (36.6 °C) (Oral) 16 5' 8\" (1.727 m) 224 lb (101.6 kg) SpO2 BMI Smoking Status 95% 34.06 kg/m2 Current Some Day Smoker Vitals History BMI and BSA Data Body Mass Index Body Surface Area 34.06 kg/m 2 2.21 m 2 Preferred Pharmacy Pharmacy Name Phone [phone] [address] [place], [address] [facility] [address] AT [address] [phone] Your Updated Medication List  
  
   
This list is accurate as of 3/12/18  1:36 PM.  Always use your most recent med list.  
  
  
  
  
 aspirin delayed-release 81 mg tablet Take  by mouth daily. losartan-hydroCHLOROthiazide 100-25 mg per tablet Commonly known as:  HYZAAR Take 1 Tab by mouth daily. PriLOSEC OTC 20 mg tablet Generic drug:  omeprazole Take 20 mg by mouth daily. Safety Needles 18 gauge x 1 1/2\" Ndle Use as directed to inject testosterone. simvastatin 40 mg tablet Commonly known as:  ZOCOR  
take 1 tablet by mouth at bedtime Syringe (Disposable) 1 mL Syrg Inject testosterone as directed. testosterone cypionate 200 mg/mL injection Commonly known as:  DEPOTESTOTERONE CYPIONATE INJECT 200 MG IN THE MUSCLE EVERY 4 WEEKS Prescriptions Printed Refills  
 simvastatin (ZOCOR) 40 mg tablet 1 Sig: take 1 tablet by mouth at bedtime Class: Print Follow-up Instructions Return in about 3 months (around 6/12/2018) for htn. Patient Instructions Low Sodium Diet (2,000 Milligram): Care Instructions Your Care Instructions Too much sodium causes your body to hold on to extra water. This can raise your blood pressure and force your heart and kidneys to work harder. In very serious cases, this could cause you to be put in the hospital. It might even be life-threatening. By limiting sodium, you will feel better and lower your risk of serious problems. The most common source of sodium is salt. People get most of the salt in their diet from canned, prepared, and packaged foods. Fast food and restaurant meals also are very high in sodium. Your doctor will probably limit your sodium to less than 2,000 milligrams (mg) a day. This limit counts all the sodium in prepared and packaged foods and any salt you add to your food. Follow-up care is a key part of your treatment and safety. Be sure to make and go to all appointments, and call your doctor if you are having problems. It's also a good idea to know your test results and keep a list of the medicines you take. How can you care for yourself at home? Read food labels · Read labels on cans and food packages. The labels tell you how much sodium is in each serving. Make sure that you look at the serving size. If you eat more than the serving size, you have eaten more sodium. · Food labels also tell you the Percent Daily Value for sodium. Choose products with low Percent Daily Values for sodium. · Be aware that sodium can come in forms other than salt, including monosodium glutamate (MSG), sodium citrate, and sodium bicarbonate (baking soda). MSG is often added to Asian food. When you eat out, you can sometimes ask for food without MSG or added salt. Buy low-sodium foods · Buy foods that are labeled \"unsalted\" (no salt added), \"sodium-free\" (less than 5 mg of sodium per serving), or \"low-sodium\" (less than 140 mg of sodium per serving). Foods labeled \"reduced-sodium\" and \"light sodium\" may still have too much sodium. Be sure to read the label to see how much sodium you are getting. · Buy fresh vegetables, or frozen vegetables without added sauces. Buy low-sodium versions of canned vegetables, soups, and other canned goods. Prepare low-sodium meals · Cut back on the amount of salt you use in cooking. This will help you adjust to the taste. Do not add salt after cooking. One teaspoon of salt has about 2,300 mg of sodium. · Take the salt shaker off the table. · Flavor your food with garlic, lemon juice, onion, vinegar, herbs, and spices. Do not use soy sauce, lite soy sauce, steak sauce, onion salt, garlic salt, celery salt, mustard, or ketchup on your food. · Use low-sodium salad dressings, sauces, and ketchup. Or make your own salad dressings and sauces without adding salt. · Use less salt (or none) when recipes call for it. You can often use half the salt a recipe calls for without losing flavor. Other foods such as rice, pasta, and grains do not need added salt. · Rinse canned vegetables, and cook them in fresh water. This removes some-but not all-of the salt. · Avoid water that is naturally high in sodium or that has been treated with water softeners, which add sodium. Call your local water company to find out the sodium content of your water supply. If you buy bottled water, read the label and choose a sodium-free brand. Avoid high-sodium foods · Avoid eating: ¨ Smoked, cured, salted, and canned meat, fish, and poultry. ¨ Ham, urena, hot dogs, and luncheon meats. ¨ Regular, hard, and processed cheese and regular peanut butter. ¨ Crackers with salted tops, and other salted snack foods such as pretzels, chips, and salted popcorn. ¨ Frozen prepared meals, unless labeled low-sodium. ¨ Canned and dried soups, broths, and bouillon, unless labeled sodium-free or low-sodium. ¨ Canned vegetables, unless labeled sodium-free or low-sodium. ¨ [place] fries, pizza, tacos, and other fast foods. ¨ Pickles, olives, ketchup, and other condiments, especially soy sauce, unless labeled sodium-free or low-sodium. Where can you learn more? Go to http://yaritza-zulema.info/. Enter W358 in the search box to learn more about \"Low Sodium Diet (2,000 Milligram): Care Instructions. \" Current as of: May 12, 2017 Content Version: 11.4 © 3953-9854 [employer]. Care instructions adapted under license by [employer] (which disclaims liability or warranty for this information). If you have questions about a medical condition or this instruction, always ask your healthcare professional. [place] [address] any warranty or liability for your use of this information. Introducing \[facility]\"" & HEALTH SERVICES! Dear Williams [place]: 
Thank you for requesting a [facility] [employer] account. Our records indicate that you already have an active [facility] [employer] account. You can access your account anytime at [email or web address] Did you know that you can access your hospital and ER discharge instructions at any time in [employer]? You can also review all of your test results from your hospital stay or ER visit. Additional Information If you have questions, please visit the Frequently Asked Questions section of the [employer] website at [email or web address] Remember, [employer] is NOT to be used for urgent needs. For medical emergencies, dial 911. Now available from your iPhone and Android! Please provide this summary of care documentation to your next provider. If you have any questions after today's visit, please call [phone].

## 2018-03-12 NOTE — PROGRESS NOTES
HISTORY OF PRESENT ILLNESS  Tiffanie De Santiago is a 63 y.o. male. HPI  Patient is here today for evaluation and treatment of: Elevated Blood Pressure    Elevated Blood Pressure:  Pt has had some elevated BPs recently; Pt has been taking BPs at home; This am BP was in the 120s/80s-  States on last Wednesday he had a BP of 160s/105. Before bed BP was better; Pt was placed on Hyzaar; He has been taking med daily and has tolerated med well;   Pt has been exercising. He states he has a stressful job and thinks this has much to do with his BP elevation. Pt additionally has hypercholesterolemia; he is on zocor and needs a refill on med. He attempts a lower cholesterol diet. BP Readings from Last 3 Encounters:   03/12/18 158/90   01/08/18 (!) 168/112   04/21/17 140/80           Current Outpatient Prescriptions:     simvastatin (ZOCOR) 40 mg tablet, take 1 tablet by mouth at bedtime, Disp: 90 Tab, Rfl: 1    losartan-hydroCHLOROthiazide (HYZAAR) 100-25 mg per tablet, Take 1 Tab by mouth daily. , Disp: 30 Tab, Rfl: 0    testosterone cypionate (DEPOTESTOTERONE CYPIONATE) 200 mg/mL injection, INJECT 200 MG IN THE MUSCLE EVERY 4 WEEKS, Disp: 3 mL, Rfl: 1    Syringe, Disposable, 1 mL syrg, Inject testosterone as directed., Disp: 100 Syringe, Rfl: 1    Safety Needles (NEEDLE, DISP, 18 G) 18 x 1 1/2 \" ndle, Use as directed to inject testosterone., Disp: 100 Each, Rfl: 1    omeprazole (PRILOSEC OTC) 20 mg tablet, Take 20 mg by mouth daily. , Disp: , Rfl:       PMH,  Meds, Allergies, Family History, Social history reviewed    Review of Systems   Constitutional: Negative for chills and fever. Respiratory: Negative for shortness of breath and wheezing. Cardiovascular: Negative for chest pain. Physical Exam   Constitutional: He appears well-developed and well-nourished. No distress. Cardiovascular: Normal rate and regular rhythm. Exam reveals no gallop and no friction rub.     No murmur heard.  Pulmonary/Chest: Breath sounds normal. No respiratory distress. He has no wheezes. He has no rales. Musculoskeletal: He exhibits no edema. Nursing note and vitals reviewed. Visit Vitals    /90    Pulse 77    Temp 97.9 °F (36.6 °C) (Oral)    Resp 16    Ht 5' 8\" (1.727 m)    Wt 224 lb (101.6 kg)    SpO2 95%    BMI 34.06 kg/m2     General appearance: alert, cooperative, no distress, appears stated age  Neck: supple, symmetrical, trachea midline, no adenopathy, thyroid: not enlarged, symmetric, no tenderness/mass/nodules, no carotid bruit and no JVD  Lungs: clear to auscultation bilaterally  Heart: regular rate and rhythm, S1, S2 normal, no murmur, click, rub or gallop  Extremities: extremities normal, atraumatic, no cyanosis or edema      ASSESSMENT and PLAN    ICD-10-CM ICD-9-CM    1. Pure hypercholesterolemia E78.00 272.0 simvastatin (ZOCOR) 40 mg tablet   2. Essential hypertension I10 401.9        As above, BP still slightly elevated   treatment plan as listed below  Orders Placed This Encounter    DISCONTD: simvastatin (ZOCOR) 40 mg tablet    simvastatin (ZOCOR) 40 mg tablet    aspirin delayed-release 81 mg tablet     Refilled zocor;  Continue to keep BP log; if BP starts to elevate, notify MD  Follow-up Disposition:  Return in about 3 months (around 6/12/2018) for htn. An After Visit Summary was printed and given to the patient. This has been fully explained to the patient, who indicates understanding.

## 2018-03-12 NOTE — PATIENT INSTRUCTIONS

## 2018-04-04 RX ORDER — LOSARTAN POTASSIUM AND HYDROCHLOROTHIAZIDE 25; 100 MG/1; MG/1
1 TABLET ORAL DAILY
Qty: 90 TAB | Refills: 0 | Status: SHIPPED | OUTPATIENT
Start: 2018-04-04 | End: 2018-06-19 | Stop reason: SDUPTHER

## 2018-06-19 ENCOUNTER — OFFICE VISIT (OUTPATIENT)
Dept: FAMILY MEDICINE CLINIC | Age: 63
End: 2018-06-19

## 2018-06-19 VITALS
HEIGHT: 68 IN | WEIGHT: 223 LBS | HEART RATE: 88 BPM | RESPIRATION RATE: 16 BRPM | BODY MASS INDEX: 33.8 KG/M2 | OXYGEN SATURATION: 95 % | TEMPERATURE: 98.3 F | SYSTOLIC BLOOD PRESSURE: 135 MMHG | DIASTOLIC BLOOD PRESSURE: 87 MMHG

## 2018-06-19 DIAGNOSIS — I10 ESSENTIAL HYPERTENSION: Primary | ICD-10-CM

## 2018-06-19 DIAGNOSIS — E78.00 HYPERCHOLESTEREMIA: ICD-10-CM

## 2018-06-19 RX ORDER — LOSARTAN POTASSIUM AND HYDROCHLOROTHIAZIDE 25; 100 MG/1; MG/1
1 TABLET ORAL DAILY
Qty: 90 TAB | Refills: 3 | Status: SHIPPED | OUTPATIENT
Start: 2018-06-19 | End: 2019-08-22 | Stop reason: SDUPTHER

## 2018-06-19 NOTE — PATIENT INSTRUCTIONS
Hyperlipidemia: After Your Visit  Your Care Instructions  Hyperlipidemia is too much fat in your blood. The body has several kinds of fat, including cholesterol and triglycerides. Your body needs fat for many things, such as making new cells. But too much fat in your blood increases your chances of having a heart attack or stroke. You may be able to lower your cholesterol and triglycerides with a heart-healthy diet, exercise, and if needed, medicine. Your doctor may want you to try lifestyle changes first to see whether they lower the fat in your blood. You may need to take medicine if lifestyle changes do not lower the fat in your blood enough. Follow-up care is a key part of your treatment and safety. Be sure to make and go to all appointments, and call your doctor if you are having problems. Its also a good idea to know your test results and keep a list of the medicines you take. How can you care for yourself at home? Take your medicines  · Take your medicines exactly as prescribed. Call your doctor if you think you are having a problem with your medicine. · If you take medicine to lower your cholesterol, go to follow-up visits. You will need to have blood tests. · Do not take large doses of niacin, which is a B vitamin, while taking medicine called statins. It may increase the chance of muscle pain and liver problems. · Talk to your doctor about avoiding grapefruit juice if you are taking statins. Grapefruit juice can raise the level of this medicine in your blood. This could increase side effects. Eat more fruits, vegetables, and fiber  · Fruits and vegetables have lots of nutrients that help protect against heart disease, and they have little--if any--fat. Try to eat at least five servings a day. Dark green, deep orange, or yellow fruits and vegetables are healthy choices. · Keep carrots, celery, and other veggies handy for snacks.  Buy fruit that is in season and store it where you can see it so that you will be tempted to eat it. Cook dishes that have a lot of veggies in them, such as stir-fries and soups. · Foods high in fiber may reduce your cholesterol and provide important vitamins and minerals. High-fiber foods include whole-grain cereals and breads, oatmeal, beans, brown rice, citrus fruits, and apples. · Buy whole-grain breads and cereals instead of white bread and pastries. Limit saturated fat  · Read food labels and try to avoid saturated fat and trans fat. They increase your risk of heart disease. · Use olive or canola oil when you cook. Try cholesterol-lowering spreads, such as Benecol or Take Control. · Bake, broil, grill, or steam foods instead of frying them. · Limit the amount of high-fat meats you eat, including hot dogs and sausages. Cut out all visible fat when you prepare meat. · Eat fish, skinless poultry, and soy products such as tofu instead of high-fat meats. Soybeans may be especially good for your heart. Eat at least two servings of fish a week. Certain fish, such as salmon, contain omega-3 fatty acids, which may help reduce your risk of heart attack. · Choose low-fat or fat-free milk and dairy products. Get exercise, limit alcohol, and quit smoking  · Get more exercise. Work with your doctor to set up an exercise program. Even if you can do only a small amount, exercise will help you get stronger, have more energy, and manage your weight and your stress. Walking is an easy way to get exercise. Gradually increase the amount you walk every day. Aim for at least 30 minutes on most days of the week. You also may want to swim, bike, or do other activities. · Limit alcohol to no more than 2 drinks a day for men and 1 drink a day for women. · Do not smoke. If you need help quitting, talk to your doctor about stop-smoking programs and medicines. These can increase your chances of quitting for good. When should you call for help?   Call 911 anytime you think you may need emergency care. For example, call if:  · You have symptoms of a heart attack. These may include:  ¨ Chest pain or pressure, or a strange feeling in the chest.  ¨ Sweating. ¨ Shortness of breath. ¨ Nausea or vomiting. ¨ Pain, pressure, or a strange feeling in the back, neck, jaw, or upper belly or in one or both shoulders or arms. ¨ Lightheadedness or sudden weakness. ¨ A fast or irregular heartbeat. After you call 911, the  may tell you to chew 1 adult-strength or 2 to 4 low-dose aspirin. Wait for an ambulance. Do not try to drive yourself. · You have signs of a stroke. These may include:  ¨ Sudden numbness, paralysis, or weakness in your face, arm, or leg, especially on only one side of your body. ¨ New problems with walking or balance. ¨ Sudden vision changes. ¨ Drooling or slurred speech. ¨ New problems speaking or understanding simple statements, or feeling confused. ¨ A sudden, severe headache that is different from past headaches. · You passed out (lost consciousness). Call your doctor now or seek immediate medical care if:  · You have muscle pain or weakness. Watch closely for changes in your health, and be sure to contact your doctor if:  · You are very tired. · You have an upset stomach, gas, constipation, or belly pain or cramps. Where can you learn more? Go to Table8.be  Enter C406 in the search box to learn more about \"Hyperlipidemia: After Your Visit. \"   © 8529-6841 [employer]. Care instructions adapted under license by [employer] (which disclaims liability or warranty for this information). This care instruction is for use with your licensed healthcare professional. If you have questions about a medical condition or this instruction, always ask your healthcare professional. [place] [address] any warranty or liability for your use of this information.   Content Version: 8.4.063565; Last Revised: October 13, 2011

## 2018-06-19 NOTE — MR AVS SNAPSHOT
[address] [facility] 
 
 
 [address] [address] [address] [address] 
[phone] Patient: Mikey Grullon 
MRN:  [id number] Visit Information Date & Time Provider Department Dept. Phone Encounter #  
 2018  8:40 AM Vicky Colin, [address] [phone] [id number] Upcoming Health Maintenance Date Due FOBT Q 1 YEAR AGE 50-75 2007 Influenza Age 5 to Adult 2018 DTaP/Tdap/Td series (2 - Td) 10/16/2018 Allergies as of 2018  Review Complete On: 2018 By: Vicky Colin MD  
  
 Severity Noted Reaction Type Reactions Zocor [Simvastatin]  2017    Myalgia Current Immunizations  Reviewed on 2018 Name Date Influenza Vaccine PF 2013 Influenza Vaccine Split 2010 TDAP Vaccine 10/16/2008 Not reviewed this visit You Were Diagnosed With   
  
 Codes Comments Essential hypertension    -  Primary ICD-10-CM: I10 
ICD-9-CM: 401.9 Hypercholesteremia     ICD-10-CM: E78.00 ICD-9-CM: 272.0 Vitals BP Pulse Temp Resp Height(growth percentile) Weight(growth percentile) 135/87 (BP 1 Location: Left arm, BP Patient Position: Sitting) 88 98.3 °F (36.8 °C) (Oral) 16 5' 8\" (1.727 m) 223 lb (101.2 kg) SpO2 BMI Smoking Status 95% 33.91 kg/m2 Current Some Day Smoker BMI and BSA Data Body Mass Index Body Surface Area  
 33.91 kg/m 2 2.2 m 2 Preferred Pharmacy Pharmacy Name Phone [facility] [phone] - [place], [address] [facility] [address] AT [address] [phone] Your Updated Medication List  
  
   
This list is accurate as of 18  9:25 AM.  Always use your most recent med list.  
  
  
  
  
 aspirin delayed-release 81 mg tablet Take  by mouth daily. losartan-hydroCHLOROthiazide 100-25 mg per tablet Commonly known as:  HYZAAR Take 1 Tab by mouth daily. PriLOSEC OTC 20 mg tablet Generic drug:  omeprazole Take 20 mg by mouth daily. Safety Needles 18 gauge x 1 1/2\" Ndle Use as directed to inject testosterone. simvastatin 40 mg tablet Commonly known as:  ZOCOR  
take 1 tablet by mouth at bedtime Syringe (Disposable) 1 mL Syrg Inject testosterone as directed. testosterone cypionate 200 mg/mL injection Commonly known as:  DEPOTESTOTERONE CYPIONATE INJECT 200 MG IN THE MUSCLE EVERY 4 WEEKS Prescriptions Sent to Pharmacy Refills  
 losartan-hydroCHLOROthiazide (HYZAAR) 100-25 mg per tablet 3 Sig: Take 1 Tab by mouth daily. Class: Normal  
 Pharmacy: [facility] [address] [place] [address] AT [address] Ph #: [phone] Route: Oral  
  
To-Do List   
 06/19/2018 Lab:  ALT   
  
 06/19/2018 Lab:  AST   
  
 06/19/2018 Lab:  LIPID PANEL   
  
 06/19/2018 Lab:  METABOLIC PANEL, BASIC Patient Instructions Hyperlipidemia: After Your Visit Your Care Instructions Hyperlipidemia is too much fat in your blood. The body has several kinds of fat, including cholesterol and triglycerides. Your body needs fat for many things, such as making new cells. But too much fat in your blood increases your chances of having a heart attack or stroke. You may be able to lower your cholesterol and triglycerides with a heart-healthy diet, exercise, and if needed, medicine. Your doctor may want you to try lifestyle changes first to see whether they lower the fat in your blood. You may need to take medicine if lifestyle changes do not lower the fat in your blood enough. Follow-up care is a key part of your treatment and safety. Be sure to make and go to all appointments, and call your doctor if you are having problems. Its also a good idea to know your test results and keep a list of the medicines you take. How can you care for yourself at home? Take your medicines · Take your medicines exactly as prescribed. Call your doctor if you think you are having a problem with your medicine. · If you take medicine to lower your cholesterol, go to follow-up visits. You will need to have blood tests. · Do not take large doses of niacin, which is a B vitamin, while taking medicine called statins. It may increase the chance of muscle pain and liver problems. · Talk to your doctor about avoiding grapefruit juice if you are taking statins. Grapefruit juice can raise the level of this medicine in your blood. This could increase side effects. Eat more fruits, vegetables, and fiber · Fruits and vegetables have lots of nutrients that help protect against heart disease, and they have littleif anyfat. Try to eat at least five servings a day. Dark green, deep orange, or yellow fruits and vegetables are healthy choices. · Keep carrots, celery, and other veggies handy for snacks. Buy fruit that is in season and store it where you can see it so that you will be tempted to eat it. Cook dishes that have a lot of veggies in them, such as stir-fries and soups. · Foods high in fiber may reduce your cholesterol and provide important vitamins and minerals. High-fiber foods include whole-grain cereals and breads, oatmeal, beans, brown rice, citrus fruits, and apples. · Buy whole-grain breads and cereals instead of white bread and pastries. Limit saturated fat · Read food labels and try to avoid saturated fat and trans fat. They increase your risk of heart disease. · Use olive or canola oil when you cook. Try cholesterol-lowering spreads, such as Benecol or Take Control. · Bake, broil, grill, or steam foods instead of frying them. · Limit the amount of high-fat meats you eat, including hot dogs and sausages. Cut out all visible fat when you prepare meat.  
· Eat fish, skinless poultry, and soy products such as tofu instead of high-fat meats. Soybeans may be especially good for your heart. Eat at least two servings of fish a week. Certain fish, such as salmon, contain omega-3 fatty acids, which may help reduce your risk of heart attack. · Choose low-fat or fat-free milk and dairy products. Get exercise, limit alcohol, and quit smoking · Get more exercise. Work with your doctor to set up an exercise program. Even if you can do only a small amount, exercise will help you get stronger, have more energy, and manage your weight and your stress. Walking is an easy way to get exercise. Gradually increase the amount you walk every day. Aim for at least 30 minutes on most days of the week. You also may want to swim, bike, or do other activities. · Limit alcohol to no more than 2 drinks a day for men and 1 drink a day for women. · Do not smoke. If you need help quitting, talk to your doctor about stop-smoking programs and medicines. These can increase your chances of quitting for good. When should you call for help? Call 911 anytime you think you may need emergency care. For example, call if: 
· You have symptoms of a heart attack. These may include: ¨ Chest pain or pressure, or a strange feeling in the chest. 
¨ Sweating. ¨ Shortness of breath. ¨ Nausea or vomiting. ¨ Pain, pressure, or a strange feeling in the back, neck, jaw, or upper belly or in one or both shoulders or arms. ¨ Lightheadedness or sudden weakness. ¨ A fast or irregular heartbeat. After you call 911, the  may tell you to chew 1 adult-strength or 2 to 4 low-dose aspirin. Wait for an ambulance. Do not try to drive yourself. · You have signs of a stroke. These may include: 
¨ Sudden numbness, paralysis, or weakness in your face, arm, or leg, especially on only one side of your body. ¨ New problems with walking or balance. ¨ Sudden vision changes. ¨ Drooling or slurred speech. ¨ New problems speaking or understanding simple statements, or feeling confused. ¨ A sudden, severe headache that is different from past headaches. · You passed out (lost consciousness). Call your doctor now or seek immediate medical care if: 
· You have muscle pain or weakness. Watch closely for changes in your health, and be sure to contact your doctor if: 
· You are very tired. · You have an upset stomach, gas, constipation, or belly pain or cramps. Where can you learn more? Go to [employer] Enter C406 in the search box to learn more about \"Hyperlipidemia: After Your Visit. \"  
© 9612-2814 Healthwise, Incorporated. Care instructions adapted under license by [facility] [employer] (which disclaims liability or warranty for this information). This care instruction is for use with your licensed healthcare professional. If you have questions about a medical condition or this instruction, always ask your healthcare professional. [address] any warranty or liability for your use of this information. Content Version: 3.4.291749; Last Revised: October 13, 2011 Introducing [facility] & HEALTH SERVICES! Dear Daja Herron: 
Thank you for requesting a [employer] account. Our records indicate that you already have an active [employer] account. You can access your account anytime at [email or web address] Did you know that you can access your hospital and ER discharge instructions at any time in [employer] Ext[employer]? You can also review all of your test results from your hospital stay or ER visit. Additional Information If you have questions, please visit the Frequently Asked Questions section of the [employer] website at [email or web address] Remember, [employer] is NOT to be used for urgent needs. For medical emergencies, dial 911. Now available from your iPhone and Android! Please provide this summary of care documentation to your next provider. If you have any questions after today's visit, please call [phone].

## 2018-06-19 NOTE — PROGRESS NOTES
HISTORY OF PRESENT ILLNESS  William Winn is a 63 y.o. male. HPI  Patient is here today for evaluation and treatment of: Hypertension    Hypertension:  BP has been controlled at home. He is on hyzaar and zocor. He may forget a few doses of zocor at night due to just forgetting taking med. Works out regularly. He swims; he has been on crestor in past as well; Med was costly. Pt's cholesterol was elevated at last check. States that he has right thigh numbness if he stands for a long time; he has no known DDD of back. Sx do  not affect his walking. The skin actually feels numb. Concerned because he has a FH RA and scleroderma    ; has been present X 2 years. States that his hands more on the right will get numb often at night when he sleeps ;  Right thumb gets painful if it is too numb. Uses a brace and this helps; Over 5 years ago after working on his pull pump he felt a \" knot\" swell up in hs right upper chest wall; near the edge of the rib cage. There is no pain, no vomiting; He just wanted it checked;  States that when he bends over the sx occur , seems to be more frequent currently. Current Outpatient Prescriptions:     losartan-hydroCHLOROthiazide (HYZAAR) 100-25 mg per tablet, Take 1 Tab by mouth daily. , Disp: 90 Tab, Rfl: 0    simvastatin (ZOCOR) 40 mg tablet, take 1 tablet by mouth at bedtime, Disp: 90 Tab, Rfl: 1    aspirin delayed-release 81 mg tablet, Take  by mouth daily. , Disp: , Rfl:     testosterone cypionate (DEPOTESTOTERONE CYPIONATE) 200 mg/mL injection, INJECT 200 MG IN THE MUSCLE EVERY 4 WEEKS, Disp: 3 mL, Rfl: 1    Syringe, Disposable, 1 mL syrg, Inject testosterone as directed., Disp: 100 Syringe, Rfl: 1    Safety Needles (NEEDLE, DISP, 18 G) 18 x 1 1/2 \" ndle, Use as directed to inject testosterone., Disp: 100 Each, Rfl: 1    omeprazole (PRILOSEC OTC) 20 mg tablet, Take 20 mg by mouth daily. , Disp: , Rfl:       PMH,  Meds, Allergies, Family History, Social history reviewed    Review of Systems   Constitutional: Negative for chills and fever. Cardiovascular: Negative for chest pain and palpitations. Physical Exam   Constitutional: He appears well-developed and well-nourished. No distress. Cardiovascular: Normal rate and regular rhythm. Exam reveals no gallop and no friction rub. No murmur heard. Pulmonary/Chest: Breath sounds normal. No respiratory distress. He has no wheezes. He has no rales. Abdominal: Bowel sounds are normal. He exhibits no distension and no mass. There is no tenderness. There is no rebound and no guarding. No evidence of abdominal hernia   Musculoskeletal: He exhibits no edema.   + moderate sized varicosities noted at the right LE;     Nursing note and vitals reviewed. Visit Vitals    /87 (BP 1 Location: Left arm, BP Patient Position: Sitting)    Pulse 88    Temp 98.3 °F (36.8 °C) (Oral)    Resp 16    Ht 5' 8\" (1.727 m)    Wt 223 lb (101.2 kg)    SpO2 95%    BMI 33.91 kg/m2     Lab Results   Component Value Date/Time    Cholesterol, total 298 (H) 01/08/2018 04:38 PM    HDL Cholesterol 50 01/08/2018 04:38 PM    LDL,Direct 183 (H) 01/08/2018 04:38 PM    LDL, calculated  01/08/2018 04:38 PM      Comment:      Triglyceride >400. LDL cannot be calculated. LDL Cholesterol Direct has been  ordered.       VLDL, calculated 83 (H) 01/08/2018 04:38 PM    Triglyceride 415 (H) 01/08/2018 04:38 PM    CHOL/HDL Ratio 4.6 08/10/2010 08:41 AM     Lab Results   Component Value Date/Time    Sodium 139 01/08/2018 04:38 PM    Potassium 4.5 01/08/2018 04:38 PM    Chloride 96 (L) 01/08/2018 04:38 PM    CO2 26 01/08/2018 04:38 PM    Anion gap 17.0 01/08/2018 04:38 PM    Glucose 86 01/08/2018 04:38 PM    BUN 15 01/08/2018 04:38 PM    Creatinine 0.9 01/08/2018 04:38 PM    BUN/Creatinine ratio 14 12/14/2010 10:03 AM    GFR est AA >59 12/14/2010 10:03 AM    GFR est non-AA >59 12/14/2010 10:03 AM    Calcium 9.2 01/08/2018 04:38 PM ASSESSMENT and PLAN    ICD-10-CM ICD-9-CM    1. Essential hypertension I10 401.9    2. Hypercholesteremia E78.00 272.0 LIPID PANEL      METABOLIC PANEL, BASIC      ALT      AST      LIPID PANEL      METABOLIC PANEL, BASIC      ALT      AST       As above, chol not controlled; Advised to comply with use of zocor;   treatment plan as listed below  Orders Placed This Encounter    LIPID PANEL    METABOLIC PANEL, BASIC    ALT    AST    losartan-hydroCHLOROthiazide (HYZAAR) 100-25 mg per tablet     Reassured about leg sx and chest wall sx; Both sound like sequela of muscle contraction. Alarm signs for worsening sx reviewed. To notify MD should such occur. Follow-up Disposition:  Return in about 6 months (around 12/19/2018) for well exam.  An After Visit Summary was printed and given to the patient. This has been fully explained to the patient, who indicates understanding.   Refilled hyzaar

## 2018-06-19 NOTE — PROGRESS NOTES
1. Have you been to the ER, urgent care clinic since your last visit? Hospitalized since your last visit? No    2. Have you seen or consulted any other health care providers outside of the [address] [place] [address] since your last visit? Include any pap smears or colon screening.  No

## 2018-06-20 LAB
ALT SERPL-CCNC: 28 U/L (ref 5–40)
ANION GAP SERPL CALC-SCNC: 17 MMOL/L
AST SERPL W P-5'-P-CCNC: 25 U/L (ref 10–37)
BUN SERPL-MCNC: 13 MG/DL (ref 6–22)
CALCIUM SERPL-MCNC: 9.3 MG/DL (ref 8.4–10.4)
CHLORIDE SERPL-SCNC: 94 MMOL/L (ref 98–110)
CHOLEST SERPL-MCNC: 212 MG/DL (ref 110–200)
CO2 SERPL-SCNC: 28 MMOL/L (ref 20–32)
CREAT SERPL-MCNC: 1.1 MG/DL (ref 0.8–1.6)
GFRAA, 66117: >60
GFRNA, 66118: >60
GLUCOSE SERPL-MCNC: 82 MG/DL (ref 70–99)
HDLC SERPL-MCNC: 4.2 MG/DL (ref 0–5)
HDLC SERPL-MCNC: 50 MG/DL (ref 40–59)
LDLC SERPL CALC-MCNC: 122 MG/DL (ref 50–99)
POTASSIUM SERPL-SCNC: 4.5 MMOL/L (ref 3.5–5.5)
SODIUM SERPL-SCNC: 139 MMOL/L (ref 133–145)
TRIGL SERPL-MCNC: 204 MG/DL (ref 40–149)
VLDLC SERPL CALC-MCNC: 41 MG/DL (ref 8–30)

## 2018-10-05 ENCOUNTER — OFFICE VISIT (OUTPATIENT)
Dept: VASCULAR SURGERY | Age: 63
End: 2018-10-05

## 2018-10-05 VITALS
HEIGHT: 68 IN | SYSTOLIC BLOOD PRESSURE: 140 MMHG | HEART RATE: 80 BPM | DIASTOLIC BLOOD PRESSURE: 80 MMHG | BODY MASS INDEX: 33.8 KG/M2 | WEIGHT: 223 LBS | RESPIRATION RATE: 18 BRPM

## 2018-10-05 DIAGNOSIS — I87.2 SAPHENOFEMORAL VENOUS REFLUX: ICD-10-CM

## 2018-10-05 DIAGNOSIS — I83.811 VARICOSE VEINS OF LEG WITH PAIN, RIGHT: Primary | ICD-10-CM

## 2018-10-05 NOTE — PROGRESS NOTES
Kristie Solis III Chief Complaint Patient presents with  New Patient  Leg Pain  Swelling  Varicose Veins History and Physical   
28-year-old male here today for evaluation for large dilated painful varicose vein. He has had this vein present for many years now present. He has been wearing knee-high and intermittent stockings throughout this time. He was worked up for a varicose vein stripping in the past but had difficulties with a recent death in the family and had put it off. He has had no shortness of breath no history of blood clot. No chest pain. He is a healthy 28-year-old with history of hypertension. Nondiabetic no history of dialysis. Past Medical History:  
Diagnosis Date  ACL (anterior cruciate ligament) tear 8/10/2010  Carpal tunnel syndrome, bilateral 5/13/2015  GERD (gastroesophageal reflux disease) 8/10/2010  HLD (hyperlipidemia) 8/10/2010  Knee pain, right 8/10/2010  Squamous cell carcinoma 12/13/2011  
 face Patient Active Problem List  
Diagnosis Code  GERD (gastroesophageal reflux disease) K21.9  
 HLD (hyperlipidemia) E78.5  ACL (anterior cruciate ligament) tear [id number]  Hypogonadism male E29.1  Carpal tunnel syndrome, bilateral G56.03  
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  
Procedure Laterality Date  BIOPSY  2011  
 face - squamous cell  HX COLONOSCOPY  2008  
 f/u 2018  HX ORTHOPAEDIC  11or10 of 2008  
 right hand Current Outpatient Prescriptions Medication Sig Dispense Refill  losartan-hydroCHLOROthiazide (HYZAAR) 100-25 mg per tablet Take 1 Tab by mouth daily. 90 Tab 3  
 simvastatin (ZOCOR) 40 mg tablet take 1 tablet by mouth at bedtime 90 Tab 1  
 aspirin delayed-release 81 mg tablet Take  by mouth daily.     
 testosterone cypionate (DEPOTESTOTERONE CYPIONATE) 200 mg/mL injection INJECT 200 MG IN THE MUSCLE EVERY 4 WEEKS 3 mL 1  Syringe, Disposable, 1 mL syrg Inject testosterone as directed. 100 Syringe 1  Safety Needles (NEEDLE, DISP, 18 G) 18 x 1 1/2 \" ndle Use as directed to inject testosterone. 100 Each 1  
 omeprazole (PRILOSEC OTC) 20 mg tablet Take 20 mg by mouth daily. Allergies Allergen Reactions  Zocor [Simvastatin] Myalgia Social History Social History  Marital status:  Spouse name: N/A  
 Number of children: N/A  
 Years of education: N/A Occupational History  Not on file. Social History Main Topics  Smoking status: Current Some Day Smoker Types: Cigars  Smokeless tobacco: Never Used Comment: once a month  Alcohol use 8.4 oz/week 14 Cans of beer per week  Drug use: No  
 Sexual activity: Yes  
  Partners: Female Birth control/ protection: None Other Topics Concern  Not on file Social History Narrative Family History Problem Relation Age of Onset  High Cholesterol Mother  High Cholesterol Father  Arthritis-rheumatoid Father  Heart Disease Father CAD  Cancer Father   
  prostate in [de-identified]  Crohn's Disease Sister Review of Systems A full review of systems was completed times ten organ systems and was deemed negative unless otherwise mentioned in the HPI. Physical Exam:   
Visit Vitals  /80 (BP 1 Location: Left arm, BP Patient Position: Sitting)  Pulse 80  Resp 18  Ht 5' 8\" (1.727 m)  Wt 223 lb (101.2 kg)  BMI 33.91 kg/m2 Healthy youthful appearing 62 Head is normocephalic Neck no JVD Chest clear Cardiac regular Abdomen soft nontender Extremities range of motion strengths are equal 
Grossly dilated varicosities branch varicosities of the saphenous and saphenous vein right leg at calf. No arterial deficit pulses are palpable Skin intact without ulceration Impression and Plan: ICD-10-CM ICD-9-CM 1. Varicose veins of leg with pain, right I83.811 454.8 DUPLEX LOWER EXT VENOUS RIGHT 2. 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 Sending him for a right leg reflux study I do anticipate this to be positive He will continue with his therapeutic support stockings He will likely benefit from saphenous ablation combined with injection sclerotherapy possible excision of varicose vein Follow-up Disposition: Not on File Francis De Leon MD 
 
PLEASE NOTE: 
This document has been produced using voice recognition software. Unrecognized errors in transcription may be present.

## 2018-10-05 NOTE — MR AVS SNAPSHOT
[address] [place] [address] [address] [facility] [address] 
[phone] Patient: Juliet Rosen 
MRN: [id number] :1955 Visit Information Date & Time Provider Department Dept. Phone Encounter #  
 10/5/2018 11:30 AM [facility] [phone] [id number] Your Appointments 10/19/2018 12:45 PM  
PROCEDURE with [facility] 2 [facility] (Deja John) Appt Note: RT Reflux C. Lupe. [address] [facility] 542 [address] [facility] [address]  
[phone] [address]  
  
    
 10/26/2018  8:30 AM  
Follow Up with MD [facility] Deja John) Appt Note: Fup after studies. Per Dr. Katherine Appiah 830am appt is good. PT confirmed 1212 [facility] 691 [address] [facility] [address]  
[phone] 1212 [facility] [address] [facility] [address] Upcoming Health Maintenance Date Due Shingrix Vaccine Age 50> (1 of 2) 2007 FOBT Q 1 YEAR AGE 50-75 2007 Influenza Age 5 to Adult 2018 DTaP/Tdap/Td series (2 - Td) 10/16/2018 Allergies as of 10/5/2018  Review Complete On: 10/5/2018 By: Castillo Damico Severity Noted Reaction Type Reactions Zocor [Simvastatin]  2017    Myalgia Current Immunizations  Reviewed on 2018 Name Date Influenza Vaccine PF 2013 Influenza Vaccine Split 2010 TDAP Vaccine 10/16/2008 Not reviewed this visit Vitals BP Pulse Resp Height(growth percentile) Weight(growth percentile) BMI  
 140/80 (BP 1 Location: Left arm, BP Patient Position: Sitting) 80 18 5' 8\" (1.727 m) 223 lb (101.2 kg) 33.91 kg/m2 Smoking Status Current Some Day Smoker Vitals History BMI and BSA Data Body Mass Index Body Surface Area 33.91 kg/m 2 2.2 m 2 Preferred Pharmacy Pharmacy Name Phone [facility] [phone] - [place], [address] [facility] [address] AT [address] [phone] Your Updated Medication List  
  
   
This list is accurate as of 10/5/18 12:23 PM.  Always use your most recent med list.  
  
  
  
  
 aspirin delayed-release 81 mg tablet Take  by mouth daily. losartan-hydroCHLOROthiazide 100-25 mg per tablet Commonly known as:  HYZAAR Take 1 Tab by mouth daily. PriLOSEC OTC 20 mg tablet Generic drug:  omeprazole Take 20 mg by mouth daily. Safety Needles 18 gauge x 1 1/2\" Ndle Use as directed to inject testosterone. simvastatin 40 mg tablet Commonly known as:  ZOCOR  
take 1 tablet by mouth at bedtime Syringe (Disposable) 1 mL Syrg Inject testosterone as directed. testosterone cypionate 200 mg/mL injection Commonly known as:  DEPOTESTOTERONE CYPIONATE INJECT 200 MG IN THE MUSCLE EVERY 4 WEEKS Introducing [facility] & [facility] SERVICES! Jon Maldonado Marcela: 
Thank you for requesting a Best[employer] account. Our records indicate that you already have an active Best[employer] account. You can access your account anytime at [email or web address]. Real Time Translation/[employer] Did you know that you can access your hospital and ER discharge instructions at any time in Beststudy? You can also review all of your test results from your hospital stay or ER visit. Additional Information If you have questions, please visit the Frequently Asked Questions section of the Best[employer] website at [email or web address]. [employer] Time [employer]/. Remember, Beststudy is NOT to be used for urgent needs. For medical emergencies, dial 911. Now available from your iPhone and Android! Please provide this summary of care documentation to your next provider. Your primary care clinician is listed as [address].  If you have any questions after today's visit, please call [phone].

## 2018-10-05 NOTE — PROGRESS NOTES
1. Have you been to an emergency room or urgent care clinic since your last visit? No 
 
Hospitalized since your last visit? If yes, where, when, and reason for visit? NO 
2. Have you seen or consulted any other health care providers outside of the [facility] since your last visit including any procedures, health maintenance items. If yes, where, when and reason for visit?  NO

## 2018-10-26 ENCOUNTER — OFFICE VISIT (OUTPATIENT)
Dept: VASCULAR SURGERY | Age: 63
End: 2018-10-26

## 2018-10-26 VITALS
SYSTOLIC BLOOD PRESSURE: 150 MMHG | DIASTOLIC BLOOD PRESSURE: 90 MMHG | HEIGHT: 68 IN | HEART RATE: 80 BPM | RESPIRATION RATE: 16 BRPM | BODY MASS INDEX: 33.8 KG/M2 | WEIGHT: 223 LBS

## 2018-10-26 DIAGNOSIS — I83.811 VARICOSE VEINS OF LEG WITH PAIN, RIGHT: Primary | ICD-10-CM

## 2018-10-26 DIAGNOSIS — I87.2 SAPHENOFEMORAL VENOUS REFLUX: ICD-10-CM

## 2018-10-26 NOTE — PROGRESS NOTES
Page Faster III    Chief Complaint   Patient presents with    Varicose Veins       History and Physical    Most pleasant 80-year-old male here today for follow-up from his ultrasound. He has progressive dilated varicose veins and become painful. He is traditionally worn compression stockings for this including a new pair of thigh-high's. With compression stockings with over-the-counter medications for pain he still has progressive symptoms and dilation of the veins on his right leg. This been ongoing for years. 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 He is worried about worsening of his leg in danger at his workplace. Past Medical History:   Diagnosis Date    ACL (anterior cruciate ligament) tear 8/10/2010    Carpal tunnel syndrome, bilateral 5/13/2015    GERD (gastroesophageal reflux disease) 8/10/2010    HLD (hyperlipidemia) 8/10/2010    Knee pain, right 8/10/2010    Squamous cell carcinoma 12/13/2011    face     Patient Active Problem List   Diagnosis Code    GERD (gastroesophageal reflux disease) K21.9    HLD (hyperlipidemia) E78.5    ACL (anterior cruciate ligament) tear S83.519A    Hypogonadism male E29.1    Carpal tunnel syndrome, bilateral G56.03    Varicose veins of leg with pain, right I83.811    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 90 Tab 3    simvastatin (ZOCOR) 40 mg tablet take 1 tablet by mouth at bedtime 90 Tab 1    aspirin delayed-release 81 mg tablet Take  by mouth daily.       testosterone cypionate (DEPOTESTOTERONE CYPIONATE) 200 mg/mL injection INJECT 200 MG IN THE MUSCLE EVERY 4 WEEKS 3 mL 1    Syringe, Disposable, 1 mL syrg Inject testosterone as directed. 100 Syringe 1    Safety Needles (NEEDLE, DISP, 18 G) 18 x 1 1/2 \" ndle Use as directed to inject testosterone. 100 Each 1    omeprazole (PRILOSEC OTC) 20 mg tablet Take 20 mg by mouth daily. Allergies   Allergen Reactions    Zocor [Simvastatin] Myalgia       Review of Systems    A full review of systems was completed times ten organ systems and was deemed negative unless otherwise mentioned in the HPI. Physical   Visit Vitals  /90 (BP 1 Location: Left arm, BP Patient Position: Sitting)   Pulse 80   Resp 16   Ht 5' 8\" (1.727 m)   Wt 223 lb (101.2 kg)   BMI 33.91 kg/m²       Healthy-appearing youthful 63  Head is normocephalic  Neck no JVD  Chest clear  Cardiac regular  Abdomen soft flat nontender  Right lower extremity which is super dilated varicose veins  No arterial deficit  His venous reflux study is shows findings as expected he has profound reflux at his greater saphenous L vein junction with the femoral at 5.8 seconds is a super dilated vein from the groin all the way to the ankle including varicose veins    Impression/Plan:     ICD-10-CM ICD-9-CM    1. Varicose veins of leg with pain, right I83.811 454.8    2. 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 Went over treatment plan the patient understands and agrees  No orders of the defined types were placed in this encounter. Follow-up Disposition: Not on 2020 Dinesh White MD    PLEASE NOTE:  This document has been produced using voice recognition software. Unrecognized errors in transcription may be present.

## 2018-10-26 NOTE — PROGRESS NOTES
1. Have you been to an emergency room or urgent care clinic since your last visit? NO    Hospitalized since your last visit? If yes, where, when, and reason for visit? NO  2. Have you seen or consulted any other health care providers outside of the [facility] since your last visit including any procedures, health maintenance items. If yes, where, when and reason for visit?  NO

## 2019-01-16 ENCOUNTER — TELEPHONE (OUTPATIENT)
Dept: VASCULAR SURGERY | Age: 64
End: 2019-01-16

## 2019-01-16 NOTE — TELEPHONE ENCOUNTER
Called patient to R/S appt and patient states that he would like to wait and call back and r/s when he can figure out his schedule.

## 2019-01-16 NOTE — TELEPHONE ENCOUNTER
----- Message from Derrick Thomas sent at 1/15/2019 10:19 AM EST -----  Contact: [phone]  Please call he needs to RS his closure

## 2019-01-18 DIAGNOSIS — E78.00 PURE HYPERCHOLESTEROLEMIA: ICD-10-CM

## 2019-01-25 RX ORDER — SIMVASTATIN 40 MG/1
TABLET, FILM COATED ORAL
Qty: 90 TAB | Refills: 0 | Status: SHIPPED | OUTPATIENT
Start: 2019-01-25 | End: 2019-02-03 | Stop reason: SDUPTHER

## 2019-02-03 DIAGNOSIS — E78.00 PURE HYPERCHOLESTEROLEMIA: ICD-10-CM

## 2019-02-06 DIAGNOSIS — E78.00 PURE HYPERCHOLESTEROLEMIA: ICD-10-CM

## 2019-02-06 RX ORDER — SIMVASTATIN 40 MG/1
TABLET, FILM COATED ORAL
Qty: 30 TAB | Refills: 0 | Status: SHIPPED | OUTPATIENT
Start: 2019-02-06 | End: 2019-03-10 | Stop reason: SDUPTHER

## 2019-02-06 RX ORDER — SIMVASTATIN 40 MG/1
TABLET, FILM COATED ORAL
Qty: 90 TAB | Refills: 0 | OUTPATIENT
Start: 2019-02-06

## 2019-03-10 DIAGNOSIS — E78.00 PURE HYPERCHOLESTEROLEMIA: ICD-10-CM

## 2019-03-11 RX ORDER — SIMVASTATIN 40 MG/1
TABLET, FILM COATED ORAL
Qty: 20 TAB | Refills: 0 | Status: SHIPPED | OUTPATIENT
Start: 2019-03-11 | End: 2019-06-17 | Stop reason: SDUPTHER

## 2019-03-12 DIAGNOSIS — E78.00 PURE HYPERCHOLESTEROLEMIA: ICD-10-CM

## 2019-03-12 RX ORDER — SIMVASTATIN 40 MG/1
TABLET, FILM COATED ORAL
Qty: 90 TAB | Refills: 0 | OUTPATIENT
Start: 2019-03-12

## 2019-05-01 DIAGNOSIS — E78.2 MIXED HYPERLIPIDEMIA: ICD-10-CM

## 2019-05-01 RX ORDER — ATORVASTATIN CALCIUM 20 MG/1
TABLET, FILM COATED ORAL
Qty: 90 TAB | Refills: 0 | OUTPATIENT
Start: 2019-05-01

## 2019-05-01 RX ORDER — ATORVASTATIN CALCIUM 20 MG/1
20 TABLET, FILM COATED ORAL DAILY
Qty: 30 TAB | Refills: 0 | OUTPATIENT
Start: 2019-05-01

## 2019-05-01 NOTE — TELEPHONE ENCOUNTER
Spoke with [facility] at [address] to inquire about medication atorvastatin. [facility] stated that patient requested a refill. Informed that patient is not taking this medication and could this request be discontinued. Praveena verbalized understanding, stated that request will be sent to the provider every time the patient keys in a request with that prescription number, and request can not be discontinued.

## 2019-05-01 NOTE — TELEPHONE ENCOUNTER
Called patient at [phone] (home)  (non-secure line) and did not leave a detailed message. Patient needs to be informed to make an appointment.

## 2019-06-17 ENCOUNTER — OFFICE VISIT (OUTPATIENT)
Dept: FAMILY MEDICINE CLINIC | Age: 64
End: 2019-06-17

## 2019-06-17 VITALS
OXYGEN SATURATION: 99 % | HEIGHT: 68 IN | WEIGHT: 224 LBS | BODY MASS INDEX: 33.95 KG/M2 | SYSTOLIC BLOOD PRESSURE: 140 MMHG | RESPIRATION RATE: 18 BRPM | TEMPERATURE: 98 F | DIASTOLIC BLOOD PRESSURE: 92 MMHG | HEART RATE: 76 BPM

## 2019-06-17 DIAGNOSIS — Z12.5 SCREENING FOR PROSTATE CANCER: ICD-10-CM

## 2019-06-17 DIAGNOSIS — Z00.00 WELL ADULT EXAM: Primary | ICD-10-CM

## 2019-06-17 DIAGNOSIS — E78.00 PURE HYPERCHOLESTEROLEMIA: ICD-10-CM

## 2019-06-17 RX ORDER — TADALAFIL 20 MG/1
20 TABLET ORAL
Qty: 6 TAB | Refills: 0 | Status: SHIPPED | OUTPATIENT
Start: 2019-06-17

## 2019-06-17 RX ORDER — SIMVASTATIN 40 MG/1
40 TABLET, FILM COATED ORAL
Qty: 90 TAB | Refills: 3 | Status: SHIPPED | OUTPATIENT
Start: 2019-06-17 | End: 2020-07-07

## 2019-06-17 NOTE — PROGRESS NOTES
Patient is here today for well male visit. 1. Have you been to the ER, urgent care clinic since your last visit? Hospitalized since your last visit? No    2. Have you seen or consulted any other health care providers outside of the [address] [place] [address] since your last visit? Include any pap smears or colon screening.  No

## 2019-06-17 NOTE — PROGRESS NOTES
Subjective: Bhavin Greco is a 66 y.o. male presenting for annual exam and complete physical.    He has situational stressors mostly involving his work. He may drink beer to help him relax; he has increasd his intake from his previous baseline. He would be interested in a med for anxiety if he was able to stop ETOH. He also does not want to take a med daily. Patient Active Problem List    Diagnosis Date Noted    Venous reflux     Varicose veins of leg with pain, right 10/05/2018    Saphenofemoral venous reflux 10/05/2018    Carpal tunnel syndrome, bilateral 05/13/2015    Hypogonadism male 03/06/2013    Squamous cell carcinoma 12/13/2011    GERD (gastroesophageal reflux disease) 08/10/2010    HLD (hyperlipidemia) 08/10/2010    ACL (anterior cruciate ligament) tear 08/10/2010     Current Outpatient Medications   Medication Sig Dispense Refill    simvastatin (ZOCOR) 40 mg tablet Take 1 Tab by mouth nightly. 90 Tab 3    tadalafil (CIALIS) 20 mg tablet Take 1 Tab by mouth once over twenty-four (24) hours. 6 Tab 0    losartan-hydroCHLOROthiazide (HYZAAR) 100-25 mg per tablet Take 1 Tab by mouth daily. 90 Tab 3    testosterone cypionate (DEPOTESTOTERONE CYPIONATE) 200 mg/mL injection INJECT 200 MG IN THE MUSCLE EVERY 4 WEEKS 3 mL 1    Syringe, Disposable, 1 mL syrg Inject testosterone as directed. 100 Syringe 1    Safety Needles (NEEDLE, DISP, 18 G) 18 x 1 1/2 \" ndle Use as directed to inject testosterone. 100 Each 1    omeprazole (PRILOSEC OTC) 20 mg tablet Take 20 mg by mouth daily.        No Active Allergies  Past Medical History:   Diagnosis Date    ACL (anterior cruciate ligament) tear 8/10/2010    Carpal tunnel syndrome, bilateral 5/13/2015    GERD (gastroesophageal reflux disease) 8/10/2010    HLD (hyperlipidemia) 8/10/2010    Knee pain, right 8/10/2010    Squamous cell carcinoma 12/13/2011    face    Venous reflux      Past Surgical History:   Procedure Laterality Date    BIOPSY  2011    face - squamous cell     HX COLONOSCOPY  2008    f/u 2018    HX ORTHOPAEDIC  11or10 of 2008    right hand      Family History   Problem Relation Age of Onset    High Cholesterol Mother     High Cholesterol Father    [facility] Arthritis-rheumatoid Father     Heart Disease Father         CAD    Cancer Father         prostate in [de-identified] Crohn's Disease Sister      Social History     Tobacco Use    Smoking status: Current Some Day Smoker     Types: Cigars    Smokeless tobacco: Never Used    Tobacco comment: once a week   Substance Use Topics    Alcohol use: Yes     Alcohol/week: 16.8 oz     Types: 28 Cans of beer per week        Last labs reviewed    Review of Systems  A comprehensive review of systems was negative except for that written in the HPI. Objective:     Visit Vitals  BP (!) 140/92   Pulse 76   Temp 98 °F (36.7 °C) (Oral)   Resp 18   Ht 5' 8\" (1.727 m)   Wt 224 lb (101.6 kg)   SpO2 99%   BMI 34.06 kg/m²     Physical exam:   General appearance - alert, well appearing, and in no distress  Ears - bilateral TM's and external ear canals normal  Nose - normal and patent, no erythema, discharge or polyps  Mouth - mucous membranes moist, pharynx normal without lesions  Neck - supple, no significant adenopathy  Lymphatics - no palpable lymphadenopathy, no hepatosplenomegaly  Chest - clear to auscultation, no wheezes, rales or rhonchi, symmetric air entry  Heart - normal rate, regular rhythm, normal S1, S2, no murmurs, rubs, clicks or gallops  Abdomen - soft, nontender, nondistended, no masses or organomegaly  Musculoskeletal - no joint tenderness, deformity or swelling  Extremities - no pedal edema noted  Skin - normal coloration and turgor, no rashes, no suspicious skin lesions noted  Flushed vs tanned  facies   Declines a prostate exm    Assessment/Plan:           ICD-10-CM ICD-9-CM    1. Well adult exam Z00.00 V70.0    2.  Pure hypercholesterolemia- for lab and refill only E78.00 272.0 simvastatin (ZOCOR) 40 mg tablet      LIPID PANEL      METABOLIC PANEL, COMPREHENSIVE      METABOLIC PANEL, COMPREHENSIVE      LIPID PANEL   3. Screening for prostate cancer Z12.5 V76.44 PSA W/ REFLX FREE PSA      PSA W/ REFLX FREE PSA   . As above,   Stress reduction advised  Follow-up and Dispositions    · Return in about 4 months (around 10/17/2019) for chol. An After Visit Summary was printed and given to the patient.   This has been fully explained to the patient, who indicates understanding.'

## 2019-06-17 NOTE — PATIENT INSTRUCTIONS

## 2019-06-18 LAB
A-G RATIO,AGRAT: 1.8 RATIO (ref 1.1–2.6)
ALBUMIN SERPL-MCNC: 5 G/DL (ref 3.5–5)
ALP SERPL-CCNC: 56 U/L (ref 40–125)
ALT SERPL-CCNC: 38 U/L (ref 5–40)
ANION GAP SERPL CALC-SCNC: 19 MMOL/L
AST SERPL W P-5'-P-CCNC: 33 U/L (ref 10–37)
BILIRUB SERPL-MCNC: 1.1 MG/DL (ref 0.2–1.2)
BUN SERPL-MCNC: 14 MG/DL (ref 6–22)
CALCIUM SERPL-MCNC: 10.1 MG/DL (ref 8.4–10.4)
CHLORIDE SERPL-SCNC: 93 MMOL/L (ref 98–110)
CHOLEST SERPL-MCNC: 215 MG/DL (ref 110–200)
CO2 SERPL-SCNC: 26 MMOL/L (ref 20–32)
CREAT SERPL-MCNC: 1.1 MG/DL (ref 0.8–1.6)
GFRAA, 66117: >60
GFRNA, 66118: >60
GLOBULIN,GLOB: 2.8 G/DL (ref 2–4)
GLUCOSE SERPL-MCNC: 91 MG/DL (ref 70–99)
HDLC SERPL-MCNC: 4.2 MG/DL (ref 0–5)
HDLC SERPL-MCNC: 51 MG/DL (ref 40–59)
LDLC SERPL CALC-MCNC: 120 MG/DL (ref 50–99)
POTASSIUM SERPL-SCNC: 4.4 MMOL/L (ref 3.5–5.5)
PROT SERPL-MCNC: 7.8 G/DL (ref 6.2–8.1)
PSA SERPL-MCNC: 2.98 NG/ML
SODIUM SERPL-SCNC: 138 MMOL/L (ref 133–145)
TRIGL SERPL-MCNC: 219 MG/DL (ref 40–149)
VLDLC SERPL CALC-MCNC: 44 MG/DL (ref 8–30)

## 2019-08-22 RX ORDER — LOSARTAN POTASSIUM AND HYDROCHLOROTHIAZIDE 25; 100 MG/1; MG/1
TABLET ORAL
Qty: 90 TAB | Refills: 0 | Status: SHIPPED | OUTPATIENT
Start: 2019-08-22 | End: 2019-12-06 | Stop reason: SDUPTHER

## 2019-09-16 LAB — LDL-C, EXTERNAL: 152

## 2019-12-17 ENCOUNTER — OFFICE VISIT (OUTPATIENT)
Dept: CARDIOLOGY CLINIC | Age: 64
End: 2019-12-17

## 2019-12-17 VITALS
SYSTOLIC BLOOD PRESSURE: 122 MMHG | HEIGHT: 68 IN | DIASTOLIC BLOOD PRESSURE: 74 MMHG | BODY MASS INDEX: 33.34 KG/M2 | WEIGHT: 220 LBS | HEART RATE: 81 BPM

## 2019-12-17 DIAGNOSIS — I87.2 VENOUS REFLUX: ICD-10-CM

## 2019-12-17 DIAGNOSIS — I10 HYPERTENSION, UNSPECIFIED TYPE: Primary | ICD-10-CM

## 2019-12-17 DIAGNOSIS — E78.5 HYPERLIPIDEMIA, UNSPECIFIED HYPERLIPIDEMIA TYPE: ICD-10-CM

## 2019-12-17 RX ORDER — OLMESARTAN MEDOXOMIL 20 MG/1
40 TABLET ORAL DAILY
COMMUNITY
End: 2020-12-24 | Stop reason: ALTCHOICE

## 2019-12-17 RX ORDER — AMLODIPINE BESYLATE 5 MG/1
5 TABLET ORAL DAILY
Qty: 30 TAB | Refills: 6 | Status: SHIPPED | OUTPATIENT
Start: 2019-12-17 | End: 2020-08-06

## 2019-12-17 NOTE — PROGRESS NOTES
Patient brought medications list.    1. Have you been to the ER, urgent care clinic since your last visit? Hospitalized since your last visit? No    2. Have you seen or consulted any other health care providers outside of the [address] [place] [address] since your last visit? Include any pap smears or colon screening.  Yes Where: Endocrinology Reason for visit: Routine Visit

## 2019-12-17 NOTE — PROGRESS NOTES
HISTORY OF PRESENT ILLNESS  Cuca Price is a 66 y.o. male. Hypertension   The history is provided by the patient. This is a chronic problem. The current episode started more than 1 week ago. The problem occurs daily. The problem has been gradually worsening. Review of Systems   Constitutional: Negative for chills, diaphoresis, fever, malaise/fatigue and weight loss. HENT: Negative for congestion, ear discharge, ear pain, hearing loss, nosebleeds, sore throat and tinnitus. Eyes: Negative for blurred vision. Respiratory: Negative for cough, hemoptysis, sputum production, wheezing and stridor. Cardiovascular: Negative for palpitations, orthopnea, claudication, leg swelling and PND. Gastrointestinal: Negative for heartburn, nausea and vomiting. Musculoskeletal: Negative for myalgias and neck pain. Skin: Negative for itching and rash. Neurological: Negative for dizziness, tingling, tremors, focal weakness, loss of consciousness and weakness. Psychiatric/Behavioral: Negative for depression and suicidal ideas.      Family History   Problem Relation Age of Onset    High Cholesterol Mother     High Cholesterol Father    [facility] Arthritis-rheumatoid Father     Heart Disease Father         CAD    Cancer Father         prostate in [de-identified] Crohn's Disease Sister        Past Medical History:   Diagnosis Date    ACL (anterior cruciate ligament) tear 8/10/2010    Carpal tunnel syndrome, bilateral 5/13/2015    GERD (gastroesophageal reflux disease) 8/10/2010    HLD (hyperlipidemia) 8/10/2010    Knee pain, right 8/10/2010    Squamous cell carcinoma 12/13/2011    face    Venous reflux        Past Surgical History:   Procedure Laterality Date    BIOPSY  2011    face - squamous cell     HX COLONOSCOPY  2008    f/u 2018    HX ORTHOPAEDIC  11or10 of 2008    right hand        Social History     Tobacco Use    Smoking status: Current Some Day Smoker     Types: Cigars    Smokeless tobacco: Never Used    Tobacco comment: once a week   Substance Use Topics    Alcohol use: Yes     Alcohol/week: 28.0 standard drinks     Types: 28 Cans of beer per week     Frequency: 4 or more times a week     Drinks per session: 1 or 2     Binge frequency: Monthly       No Known Allergies    Outpatient Medications Marked as Taking for the 12/17/19 encounter (Office Visit) with Dejan Jean MD   Medication Sig Dispense Refill    olmesartan (BENICAR) 20 mg tablet Take 40 mg by mouth daily. Patient is not prescribed this medication, took 2 pills on Sunday and 2 pills on Monday.  amLODIPine (NORVASC) 5 mg tablet Take 1 Tab by mouth daily. 30 Tab 6    losartan-hydroCHLOROthiazide (HYZAAR) 100-25 mg per tablet TAKE 1 TABLET BY MOUTH DAILY 90 Tab 5    simvastatin (ZOCOR) 40 mg tablet Take 1 Tab by mouth nightly. 90 Tab 3    testosterone cypionate (DEPOTESTOTERONE CYPIONATE) 200 mg/mL injection INJECT 200 MG IN THE MUSCLE EVERY 4 WEEKS 3 mL 1    Syringe, Disposable, 1 mL syrg Inject testosterone as directed. 100 Syringe 1    Safety Needles (NEEDLE, DISP, 18 G) 18 x 1 1/2 \" ndle Use as directed to inject testosterone. 100 Each 1    omeprazole (PRILOSEC OTC) 20 mg tablet Take 20 mg by mouth daily. Visit Vitals  /74   Pulse 81   Ht 5' 8\" (1.727 m)   Wt 99.8 kg (220 lb)   BMI 33.45 kg/m²       Physical Exam   Constitutional: He is oriented to person, place, and time. He appears well-developed and well-nourished. No distress. HENT:   Head: Atraumatic. Mouth/Throat: No oropharyngeal exudate. Eyes: Conjunctivae are normal. Right eye exhibits no discharge. Left eye exhibits no discharge. No scleral icterus. Neck: Normal range of motion. Neck supple. No JVD present. No tracheal deviation present. No thyromegaly present. Cardiovascular: Normal rate, regular rhythm and normal heart sounds. Exam reveals no gallop and no friction rub. No murmur heard.   Pulmonary/Chest: Effort normal and breath sounds normal. No stridor. No respiratory distress. He has no wheezes. He has no rales. He exhibits no tenderness. Abdominal: Soft. There is no tenderness. There is no rebound and no guarding. Musculoskeletal: Normal range of motion. General: No tenderness or edema. Comments: Has varicose veins in the right lower extremity   Lymphadenopathy:     He has no cervical adenopathy. Neurological: He is alert and oriented to person, place, and time. He exhibits normal muscle tone. Skin: Skin is warm. He is not diaphoretic. Psychiatric: He has a normal mood and affect. His behavior is normal.     ekg sinus rhythm with left atrial enlargement. ASSESSMENT and PLAN    ICD-10-CM ICD-9-CM    1. Hypertension, unspecified type I10 401.9 AMB POC EKG ROUTINE W/ 12 LEADS, INTER & REP   2. Venous reflux I87.2 459.81    3. Hyperlipidemia, unspecified hyperlipidemia type E78.5 272.4      Orders Placed This Encounter    AMB POC EKG ROUTINE W/ 12 LEADS, INTER & REP     Order Specific Question:   Reason for Exam:     Answer:   htn    amLODIPine (NORVASC) 5 mg tablet     Sig: Take 1 Tab by mouth daily. Dispense:  30 Tab     Refill:  6     Follow-up and Dispositions    · Return in about 4 months (around 4/17/2020). current treatment plan is effective, no change in therapy  reviewed diet, exercise and weight control  cardiovascular risk and specific lipid/LDL goals reviewed. Seen for follow-up. Currently on losartan/hydrochlorthiazide 100/25 mg daily. Recently started taking Benicar 20 mg due to uncontrolled hypertension. Denies chest pain or shortness of breath. Effort tolerance remains excellent. Will discontinue Benicar and start patient on Norvasc 5 mg. Advised home blood pressure monitoring and return to clinic in 4 months. Also has symptoms of sleep apnea however wants to wait on sleep study at this time.

## 2020-07-14 ENCOUNTER — OFFICE VISIT (OUTPATIENT)
Dept: CARDIOLOGY CLINIC | Age: 65
End: 2020-07-14

## 2020-07-14 VITALS
HEART RATE: 77 BPM | WEIGHT: 220.6 LBS | TEMPERATURE: 98 F | DIASTOLIC BLOOD PRESSURE: 74 MMHG | SYSTOLIC BLOOD PRESSURE: 133 MMHG | HEIGHT: 68 IN | OXYGEN SATURATION: 99 % | BODY MASS INDEX: 33.43 KG/M2

## 2020-07-14 DIAGNOSIS — E78.5 HYPERLIPIDEMIA, UNSPECIFIED HYPERLIPIDEMIA TYPE: ICD-10-CM

## 2020-07-14 DIAGNOSIS — I10 HYPERTENSION, UNSPECIFIED TYPE: Primary | ICD-10-CM

## 2020-07-14 RX ORDER — ATORVASTATIN CALCIUM 20 MG/1
20 TABLET, FILM COATED ORAL DAILY
Qty: 90 TAB | Refills: 2 | Status: SHIPPED | OUTPATIENT
Start: 2020-07-14 | End: 2021-06-11 | Stop reason: SDUPTHER

## 2020-07-14 NOTE — PROGRESS NOTES
1. Have you been to the ER, urgent care clinic since your last visit? Hospitalized since your last visit? No    2. Have you seen or consulted any other health care providers outside of the [address] [place] [address] since your last visit? Include any pap smears or colon screening.  Yes Where: Endocrineology Reason for visit: Testosterone

## 2020-07-14 NOTE — PROGRESS NOTES
HISTORY OF PRESENT ILLNESS  Junito Garza is a 60 y.o. male. Hypertension   The history is provided by the patient. This is a chronic problem. The current episode started more than 1 week ago. The problem occurs daily. The problem has been gradually improving. Pertinent negatives include no shortness of breath. Review of Systems   Constitutional: Negative for chills, diaphoresis, fever, malaise/fatigue and weight loss. HENT: Negative for congestion, ear discharge, ear pain, hearing loss, nosebleeds, sore throat and tinnitus. Eyes: Negative for blurred vision. Respiratory: Negative for cough, hemoptysis, sputum production, shortness of breath, wheezing and stridor. Cardiovascular: Negative for palpitations, orthopnea, claudication, leg swelling and PND. Gastrointestinal: Negative for heartburn, nausea and vomiting. Musculoskeletal: Negative for myalgias and neck pain. Skin: Negative for itching and rash. Neurological: Negative for dizziness, tingling, tremors, focal weakness, loss of consciousness and weakness. Psychiatric/Behavioral: Negative for depression and suicidal ideas.      Family History   Problem Relation Age of Onset    High Cholesterol Mother     High Cholesterol Father    [facility] Arthritis-rheumatoid Father     Heart Disease Father         CAD    Cancer Father         prostate in [de-identified] Crohn's Disease Sister        Past Medical History:   Diagnosis Date    ACL (anterior cruciate ligament) tear 8/10/2010    Carpal tunnel syndrome, bilateral 5/13/2015    GERD (gastroesophageal reflux disease) 8/10/2010    HLD (hyperlipidemia) 8/10/2010    Knee pain, right 8/10/2010    Squamous cell carcinoma 12/13/2011    face    Venous reflux        Past Surgical History:   Procedure Laterality Date    BIOPSY  2011    face - squamous cell     HX COLONOSCOPY  2008    f/u 2018    HX ORTHOPAEDIC  11or10 of 2008    right hand        Social History     Tobacco Use    Smoking status: Current Some Day Smoker     Types: Cigars    Smokeless tobacco: Never Used    Tobacco comment: once a week   Substance Use Topics    Alcohol use: Yes     Alcohol/week: 28.0 standard drinks     Types: 28 Cans of beer per week     Frequency: 4 or more times a week     Drinks per session: 1 or 2     Binge frequency: Monthly       No Known Allergies    Outpatient Medications Marked as Taking for the 7/14/20 encounter (Office Visit) with Chiara Bhakta MD   Medication Sig Dispense Refill    atorvastatin (LIPITOR) 20 mg tablet Take 1 Tab by mouth daily. 90 Tab 2    amLODIPine (NORVASC) 5 mg tablet Take 1 Tab by mouth daily. 30 Tab 6    losartan-hydroCHLOROthiazide (HYZAAR) 100-25 mg per tablet TAKE 1 TABLET BY MOUTH DAILY 90 Tab 5    testosterone cypionate (DEPOTESTOTERONE CYPIONATE) 200 mg/mL injection INJECT 200 MG IN THE MUSCLE EVERY 4 WEEKS 3 mL 1    Syringe, Disposable, 1 mL syrg Inject testosterone as directed. 100 Syringe 1    Safety Needles (NEEDLE, DISP, 18 G) 18 x 1 1/2 \" ndle Use as directed to inject testosterone. 100 Each 1    omeprazole (PRILOSEC OTC) 20 mg tablet Take 20 mg by mouth daily. Visit Vitals  /74 (BP 1 Location: Left arm, BP Patient Position: Sitting)   Pulse 77   Temp 98 °F (36.7 °C) (Temporal)   Ht 5' 8\" (1.727 m)   Wt 100.1 kg (220 lb 9.6 oz)   SpO2 99%   BMI 33.54 kg/m²       Physical Exam   Constitutional: He is oriented to person, place, and time. He appears well-developed and well-nourished. No distress. HENT:   Head: Atraumatic. Mouth/Throat: No oropharyngeal exudate. Eyes: Conjunctivae are normal. Right eye exhibits no discharge. Left eye exhibits no discharge. No scleral icterus. Neck: Normal range of motion. Neck supple. No JVD present. No tracheal deviation present. No thyromegaly present. Cardiovascular: Normal rate, regular rhythm and normal heart sounds. Exam reveals no gallop and no friction rub.    No murmur heard.  Pulmonary/Chest: Effort normal and breath sounds normal. No stridor. No respiratory distress. He has no wheezes. He has no rales. He exhibits no tenderness. Abdominal: Soft. There is no abdominal tenderness. There is no rebound and no guarding. Musculoskeletal: Normal range of motion. General: No tenderness or edema. Comments: Has varicose veins in the right lower extremity   Lymphadenopathy:     He has no cervical adenopathy. Neurological: He is alert and oriented to person, place, and time. He exhibits normal muscle tone. Skin: Skin is warm. He is not diaphoretic. Psychiatric: He has a normal mood and affect. His behavior is normal.     ekg sinus rhythm with left atrial enlargement. ASSESSMENT and PLAN    ICD-10-CM ICD-9-CM    1. Hypertension, unspecified type  I10 401.9    2. Hyperlipidemia, unspecified hyperlipidemia type  E78.5 272.4      Orders Placed This Encounter    atorvastatin (LIPITOR) 20 mg tablet     Sig: Take 1 Tab by mouth daily. Dispense:  90 Tab     Refill:  2     Follow-up and Dispositions    · Return in about 1 year (around 7/14/2021). current treatment plan is effective, no change in therapy  reviewed diet, exercise and weight control  cardiovascular risk and specific lipid/LDL goals reviewed. Coronary calcium scoring in 2018-  Was 1    Seen for follow-up. Currently on losartan/hydrochlorthiazide 100/25 mg daily and Norvasc 5 mg. ET good with no chest pain or sob. Continue current meds and advised weight reduction. Will switch to lipitor due to muscle pain.   F/u in 1 yr

## 2020-08-06 RX ORDER — AMLODIPINE BESYLATE 5 MG/1
TABLET ORAL
Qty: 30 TAB | Refills: 6 | Status: SHIPPED | OUTPATIENT
Start: 2020-08-06 | End: 2020-12-23

## 2020-09-01 ENCOUNTER — TELEPHONE (OUTPATIENT)
Dept: FAMILY MEDICINE CLINIC | Age: 65
End: 2020-09-01

## 2020-09-02 ENCOUNTER — TELEPHONE (OUTPATIENT)
Dept: FAMILY MEDICINE CLINIC | Age: 65
End: 2020-09-02

## 2020-09-03 ENCOUNTER — VIRTUAL VISIT (OUTPATIENT)
Dept: FAMILY MEDICINE CLINIC | Age: 65
End: 2020-09-03

## 2020-09-28 ENCOUNTER — OFFICE VISIT (OUTPATIENT)
Dept: ORTHOPEDIC SURGERY | Age: 65
End: 2020-09-28
Payer: COMMERCIAL

## 2020-09-28 VITALS
RESPIRATION RATE: 16 BRPM | TEMPERATURE: 97.6 F | SYSTOLIC BLOOD PRESSURE: 147 MMHG | WEIGHT: 218 LBS | BODY MASS INDEX: 33.04 KG/M2 | HEART RATE: 77 BPM | DIASTOLIC BLOOD PRESSURE: 93 MMHG | HEIGHT: 68 IN | OXYGEN SATURATION: 98 %

## 2020-09-28 DIAGNOSIS — M54.16 BILATERAL LUMBAR RADICULOPATHY: ICD-10-CM

## 2020-09-28 DIAGNOSIS — M47.816 LUMBAR FACET ARTHROPATHY: ICD-10-CM

## 2020-09-28 DIAGNOSIS — M54.6 MIDLINE THORACIC BACK PAIN, UNSPECIFIED CHRONICITY: ICD-10-CM

## 2020-09-28 DIAGNOSIS — M54.50 LOW BACK PAIN, UNSPECIFIED BACK PAIN LATERALITY, UNSPECIFIED CHRONICITY, UNSPECIFIED WHETHER SCIATICA PRESENT: Primary | ICD-10-CM

## 2020-09-28 DIAGNOSIS — M62.838 MUSCLE SPASM: ICD-10-CM

## 2020-09-28 DIAGNOSIS — M16.0 BILATERAL PRIMARY OSTEOARTHRITIS OF HIP: ICD-10-CM

## 2020-09-28 DIAGNOSIS — M51.34 DDD (DEGENERATIVE DISC DISEASE), THORACIC: ICD-10-CM

## 2020-09-28 DIAGNOSIS — M54.14 THORACIC RADICULOPATHY: ICD-10-CM

## 2020-09-28 PROCEDURE — 72070 X-RAY EXAM THORAC SPINE 2VWS: CPT | Performed by: PHYSICAL MEDICINE & REHABILITATION

## 2020-09-28 PROCEDURE — 99204 OFFICE O/P NEW MOD 45 MIN: CPT | Performed by: PHYSICAL MEDICINE & REHABILITATION

## 2020-09-28 PROCEDURE — 72110 X-RAY EXAM L-2 SPINE 4/>VWS: CPT | Performed by: PHYSICAL MEDICINE & REHABILITATION

## 2020-09-28 RX ORDER — GABAPENTIN 100 MG/1
CAPSULE ORAL
Qty: 60 CAP | Refills: 1 | Status: SHIPPED | OUTPATIENT
Start: 2020-09-28

## 2020-09-28 RX ORDER — DICLOFENAC SODIUM 75 MG/1
75 TABLET, DELAYED RELEASE ORAL 2 TIMES DAILY WITH MEALS
Qty: 60 TAB | Refills: 1 | Status: SHIPPED | OUTPATIENT
Start: 2020-09-28

## 2020-09-28 RX ORDER — METHYLPREDNISOLONE 4 MG/1
TABLET ORAL
Qty: 1 DOSE PACK | Refills: 0 | Status: SHIPPED | OUTPATIENT
Start: 2020-09-28 | End: 2022-02-01 | Stop reason: ALTCHOICE

## 2020-09-28 NOTE — PROGRESS NOTES
[facility]  [address] [place] [address], [facility] [address]  Phone: [phone]  Fax: [phone]    NEW PATIENT  Pt's YOB: 1955    ASSESSMENT   Diagnoses and all orders for this visit:    1. Low back pain, unspecified back pain laterality, unspecified chronicity, unspecified whether sciatica present  -     AMB POC XRAY, SPINE, LUMBOSACRAL; 4+  -     MRI LUMB SPINE WO CONT; Future  -     REFERRAL TO PHYSICAL THERAPY  -     methylPREDNISolone (MEDROL DOSEPACK) 4 mg tablet; Per dose pack instructions    2. Midline thoracic back pain, unspecified chronicity  -     AMB POC XRAY, SPINE; THORACIC, 2 VIEW  -     MRI LUMB SPINE WO CONT; Future  -     REFERRAL TO PHYSICAL THERAPY  -     methylPREDNISolone (MEDROL DOSEPACK) 4 mg tablet; Per dose pack instructions    3. Bilateral lumbar radiculopathy  -     gabapentin (Neurontin) 100 mg capsule; Take 1 cap by mouth at night for 1 week, then increase to 2 as directed. -     MRI LUMB SPINE WO CONT; Future  -     REFERRAL TO PHYSICAL THERAPY    4. Lumbar facet arthropathy  -     MRI LUMB SPINE WO CONT; Future  -     REFERRAL TO PHYSICAL THERAPY  -     methylPREDNISolone (MEDROL DOSEPACK) 4 mg tablet; Per dose pack instructions  -     diclofenac EC (VOLTAREN) 75 mg EC tablet; Take 1 Tab by mouth two (2) times daily (with meals). 5. Muscle spasm  -     MRI LUMB SPINE WO CONT; Future  -     REFERRAL TO PHYSICAL THERAPY    6. Thoracic radiculopathy  -     REFERRAL TO PHYSICAL THERAPY    7. DDD (degenerative disc disease), thoracic  -     REFERRAL TO PHYSICAL THERAPY    8. Bilateral primary osteoarthritis of hip  -     REFERRAL TO PHYSICAL THERAPY  -     methylPREDNISolone (MEDROL DOSEPACK) 4 mg tablet; Per dose pack instructions  -     diclofenac EC (VOLTAREN) 75 mg EC tablet; Take 1 Tab by mouth two (2) times daily (with meals).     9. BMI 33.0-33.9,adult         IMPRESSION AND PLAN:  Gearline Kawasaki III is a 60 y.o. male with history of low back pain. He complains of pain in the lower back that occasionally radiates down both legs x 5 weeks. Pt notes that when his lower back pain is severe, it wraps around to the lower abdomen with testicular pain. He denies any improvement in his most recent episode of pain when taking ibuprofen. 1) Pt was given information on lumbar arthritis exercises. 2) He was prescribed Neurontin 100 mg 2 caps QHS, tapering up as directed. 3) Pt was referred to lumbar physical therapy. 4) He was prescribed a Medrol Dosepak. 5) Pt was also prescribed Voltaren 75 mg 1 tab BID prn pain with meals. 6) A lumbar MRI (including the lower thoracic region) was ordered. He has increased lumbar pain with bilateral radicular symptoms, numbness/tingling in the left leg, DDD at L5-S1, and he has taken NSAID's.  7) Mr. Melani Méndez has a reminder for a \"due or due soon\" health maintenance. I have asked that he contact his primary care provider, Aureliano Castillo MD, for follow-up on this health maintenance. 8)  demonstrated consistency with prescribing. 9) Consider thoracic MRI pending lumbar MRI results  10) Weight loss recommended  11) Physical therapy was ordered  Follow-up and Dispositions    · Return in about 4 weeks (around 10/26/2020) for PT follow up, Diagnostic Test follow up, Medication follow up. HISTORY OF PRESENT ILLNESS:  Mamadou Torres is a 60 y.o. male with history of low back pain and presents to the office today as a new patient. He complains of pain in the lower back that occasionally radiates down both legs x 5 weeks. Pt also complains of pain in the groin. Pt notes that when his lower back pain is severe, it wraps around to the lower abdomen with testicular pain. His pain generally improves when standing, lying down, changing positions, and walking and worsens when bending forward, sitting, and lifting.  Pt complains of pain and numbness in the left foot but he denies any weakness in the legs at this time. He also complains of pain in the left foot when walking at [address] a couple days ago but notes that his pain then suddenly improved on its own. Pt notes that he took a golf trip a couple weeks ago and notes by the end to the day, he was unable to sit secondary to pain. He reports intermittent severe right hip pain, and left knee pain (reporting a unrepaired ACL tear). Pt notes episodes of lower back pain over the years but reports improvement when taking ibuprofen. He denies any improvement in his most recent pain when taking ibuprofen. Pt reports temporary relief when taking a Medrol Dosepak about 3 weeks ago. 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 Pt denies any recent falls but notes that he did fall off a roof many years ago. He reports that prior to COVID-19 he frequented the gym but admits that he has not returned to the gym or has been exercising as regularly since the shutdown. Pt at this time desires to proceed with medication evaluation, physical therapy, and a lumbar MRI.     Pain Scale: 1/10    PCP: Merary Anderson MD     Past Medical History:   Diagnosis Date    ACL (anterior cruciate ligament) tear 8/10/2010    Carpal tunnel syndrome, bilateral 5/13/2015    GERD (gastroesophageal reflux disease) 8/10/2010    HLD (hyperlipidemia) 8/10/2010    Knee pain, right 8/10/2010    Squamous cell carcinoma 12/13/2011    face    Venous reflux         Social History     Socioeconomic History    Marital status:      Spouse name: Not on file    Number of children: Not on file    Years of education: Not on file    Highest education level: Not on file   Occupational History    Not on file   Social Needs    Financial resource strain: Not on file    Food insecurity Worry: Not on file     Inability: Not on file    Transportation needs     Medical: Not on file     Non-medical: Not on file   Tobacco Use    Smoking status: Current Some Day Smoker     Types: Cigars    Smokeless tobacco: Never Used    Tobacco comment: once a week   Substance and Sexual Activity    Alcohol use: Yes     Alcohol/week: 28.0 standard drinks     Types: 28 Cans of beer per week     Frequency: 4 or more times a week     Drinks per session: 1 or 2     Binge frequency: Monthly    Drug use: No    Sexual activity: Yes     Partners: Female     Birth control/protection: None   Lifestyle    Physical activity     Days per week: Not on file     Minutes per session: Not on file    Stress: Not on file   Relationships    Social connections     Talks on phone: Not on file     Gets together: Not on file     Attends Christian service: Not on file     Active member of club or organization: Not on file     Attends meetings of clubs or organizations: Not on file     Relationship status: Not on file    Intimate partner violence     Fear of current or ex partner: Not on file     Emotionally abused: Not on file     Physically abused: Not on file     Forced sexual activity: Not on file   Other Topics Concern    Not on file   Social History Narrative    Not on file       Current Outpatient Medications   Medication Sig Dispense Refill    gabapentin (Neurontin) 100 mg capsule Take 1 cap by mouth at night for 1 week, then increase to 2 as directed. 60 Cap 1    methylPREDNISolone (MEDROL DOSEPACK) 4 mg tablet Per dose pack instructions 1 Dose Pack 0    diclofenac EC (VOLTAREN) 75 mg EC tablet Take 1 Tab by mouth two (2) times daily (with meals). 60 Tab 1    amLODIPine (NORVASC) 5 mg tablet TAKE 1 TABLET BY MOUTH DAILY 30 Tab 6    atorvastatin (LIPITOR) 20 mg tablet Take 1 Tab by mouth daily.  90 Tab 2    losartan-hydroCHLOROthiazide (HYZAAR) 100-25 mg per tablet TAKE 1 TABLET BY MOUTH DAILY 90 Tab 5    testosterone cypionate (DEPOTESTOTERONE CYPIONATE) 200 mg/mL injection INJECT 200 MG IN THE MUSCLE EVERY 4 WEEKS (Patient taking differently: 200 mg by IntraMUSCular route every fourteen (14) days. INJECT 200 MG IN THE MUSCLE EVERY 4 WEEKS) 3 mL 1    Syringe, Disposable, 1 mL syrg Inject testosterone as directed. 100 Syringe 1    Safety Needles (NEEDLE, DISP, 18 G) 18 x 1 1/2 \" ndle Use as directed to inject testosterone. 100 Each 1    omeprazole (PRILOSEC OTC) 20 mg tablet Take 20 mg by mouth daily.  olmesartan (BENICAR) 20 mg tablet Take 40 mg by mouth daily. Patient is not prescribed this medication, took 2 pills on Sunday and 2 pills on Monday.  tadalafil (CIALIS) 20 mg tablet Take 1 Tab by mouth once over twenty-four (24) hours. 6 Tab 0       No Known Allergies      REVIEW OF SYSTEMS    Constitutional: Negative for fever, chills, or weight change. Respiratory: Negative for cough or shortness of breath. Cardiovascular: Negative for chest pain or palpitations. Gastrointestinal: Negative for acid reflux, change in bowel habits, or constipation. Genitourinary: Negative for dysuria and flank pain. Musculoskeletal: Positive for lumbar and lower thoracic pain. Skin: Negative for rash. Neurological: Negative for headaches or dizziness. Positive for numbness in the left foot and abdominal numbness. Endo/Heme/Allergies: Negative for increased bruising. Psychiatric/Behavioral: Positive for difficulty with sleep. PHYSICAL EXAMINATION  Visit Vitals  BP (!) 147/93 (BP 1 Location: Right arm, BP Patient Position: Sitting) Comment: pt asymptomatic, MD aware   Pulse 77   Temp 97.6 °F (36.4 °C) (Skin)   Resp 16   Ht 5' 8\" (1.727 m)   Wt 218 lb (98.9 kg)   SpO2 98% Comment: RA   BMI 33.15 kg/m²       Constitutional: Awake, alert, and in no acute distress. HEENT: Normocephalic. Atraumatic. Oropharynx is moist and clear. PERRL. EOMI.  Sclerae are nonicteric  Cardiovascular: Regular rate and rhythm  Lungs: Clear to auscultation bilaterally  Abdomen: Soft and nontender. Bowel sounds are present  Neurological: 1+ symmetrical DTRs in the upper extremities. 1+ symmetrical DTRs in the lower extremities. Sensation to light touch is intact. Negative Gar's sign bilaterally. Skin: warm, dry, and intact. Musculoskeletal: Tenderness to palpation in the lumbar region. No pain with extension or axial loading. Pain with forward flexion. Mild decreased range of motion with internal rotation of his left hip. Negative straight leg raise bilaterally. No difficulty with heel or toe walking. No difficulty with the single leg stance bilaterally. Biceps  Triceps Deltoids Wrist Ext Wrist Flex Hand Intrin   Right +4/5 +4/5 +4/5 +4/5 +4/5 +4/5   Left +4/5 +4/5 +4/5 +4/5 +4/5 +4/5      Hip Flex  Quads Hamstrings Ankle DF EHL Ankle PF   Right +4/5 +4/5 +4/5 +4/5 +4/5 +4/5   Left +4/5 +4/5 +4/5 +4/5 +4/5 +4/5     IMAGING:     Lumbar spine 4V x-rays from 9/28/2020 were personally reviewed with the patient and demonstrated:  Bilateral degenerative joint findings in both hips, R>L. Multilevel degenerative facets. Listhesis L5-S1. Mild degenerative disc at L4-5 and L1-2. Wedge deformity of L1. Possible pars defect at L5-S1. Thoracic spine 2V x-rays from 9/28/2020 were personally reviewed with the patient and demonstrated:  Degenerative disc at T11-12. Lateral spurring at T11-12. Written by Trev Mcclure, as dictated by Arnold Martines MD.  I, Dr. Arnold Martines confirm that all documentation is accurate.

## 2020-09-28 NOTE — LETTER
9/29/20 Patient: Akhil Belt III YOB: 1955 Date of Visit: 9/28/2020 Joe Min MD 
[address] [facility] [address] [facility] [address] VIA In Basket Dear Joe Min MD, Thank you for referring Mr. Cl Watson to [address] [place] [address] for evaluation. My notes for this consultation are attached. If you have questions, please do not hesitate to call me. I look forward to following your patient along with you. Sincerely, Raquel Quiñonez MD

## 2020-09-28 NOTE — PROGRESS NOTES
Junito Fitting presents today for   Chief Complaint   Patient presents with    Back Pain       Is someone accompanying this pt? no    Is the patient using any DME equipment during OV? no    Depression Screening:  3 most recent PHQ Screens 7/14/2020   Little interest or pleasure in doing things Not at all   Feeling down, depressed, irritable, or hopeless Not at all   Total Score PHQ 2 0       Learning Assessment:  Learning Assessment 10/26/2018   PRIMARY LEARNER Patient   HIGHEST LEVEL OF EDUCATION - PRIMARY LEARNER  -   PRIMARY LANGUAGE ENGLISH   LEARNER PREFERENCE PRIMARY LISTENING   ANSWERED BY patient   RELATIONSHIP SELF       Abuse Screening:  Abuse Screening Questionnaire 3/12/2018   Do you ever feel afraid of your partner? N   Are you in a relationship with someone who physically or mentally threatens you? N   Is it safe for you to go home? Y         Coordination of Care:  1. Have you been to the ER, urgent care clinic since your last visit? no  Hospitalized since your last visit? no    2. Have you seen or consulted any other health care providers outside of the [address] [place] [address] since your last visit? no Include any pap smears or colon screening.  no

## 2020-09-28 NOTE — PATIENT INSTRUCTIONS
Low Back Arthritis: Exercises Introduction Here are some examples of typical rehabilitation exercises for your condition. Start each exercise slowly. Ease off the exercise if you start to have pain. Your doctor or physical therapist will tell you when you can start these exercises and which ones will work best for you. When you are not being active, find a comfortable position for rest. Some people are comfortable on the floor or a medium-firm bed with a small pillow under their head and another under their knees. Some people prefer to lie on their side with a pillow between their knees. Don't stay in one position for too long. Take short walks (10 to 20 minutes) every 2 to 3 hours. Avoid slopes, hills, and stairs until you feel better. Walk only distances you can manage without pain, especially leg pain. How to do the exercises Pelvic tilt 1. Lie on your back with your knees bent. 2. \"Brace\" your stomachtighten your muscles by pulling in and imagining your belly button moving toward your spine. 3. Press your lower back into the floor. You should feel your hips and pelvis rock back. 4. Hold for 6 seconds while breathing smoothly. 5. Relax and allow your pelvis and hips to rock forward. 6. Repeat 8 to 12 times. Back stretches 1. Get down on your hands and knees on the floor. 2. Relax your head and allow it to droop. Round your back up toward the ceiling until you feel a nice stretch in your upper, middle, and lower back. Hold this stretch for as long as it feels comfortable, or about 15 to 30 seconds. 3. Return to the starting position with a flat back while you are on your hands and knees. 4. Let your back sway by pressing your stomach toward the floor. Lift your buttocks toward the ceiling. 5. Hold this position for 15 to 30 seconds. 6. Repeat 2 to 4 times. Follow-up care is a key part of your treatment and safety.  Be sure to make and go to all appointments, and call your doctor if you are having problems. It's also a good idea to know your test results and keep a list of the medicines you take. Where can you learn more? Go to http://www.gray.com/ Enter H226 in the search box to learn more about \"Low Back Arthritis: Exercises. \" Current as of: March 2, 2020               Content Version: 12.6 © 2006-2020 [employer]. Care instructions adapted under license by [employer] (which disclaims liability or warranty for this information). If you have questions about a medical condition or this instruction, always ask your healthcare professional. [address] any warranty or liability for your use of this information.

## 2020-10-05 DIAGNOSIS — M54.6 MIDLINE THORACIC BACK PAIN, UNSPECIFIED CHRONICITY: ICD-10-CM

## 2020-10-05 DIAGNOSIS — M47.816 LUMBAR FACET ARTHROPATHY: ICD-10-CM

## 2020-10-05 DIAGNOSIS — M54.16 BILATERAL LUMBAR RADICULOPATHY: ICD-10-CM

## 2020-10-05 DIAGNOSIS — M62.838 MUSCLE SPASM: ICD-10-CM

## 2020-10-05 DIAGNOSIS — M54.50 LOW BACK PAIN, UNSPECIFIED BACK PAIN LATERALITY, UNSPECIFIED CHRONICITY, UNSPECIFIED WHETHER SCIATICA PRESENT: ICD-10-CM

## 2020-10-09 ENCOUNTER — HOSPITAL ENCOUNTER (OUTPATIENT)
Age: 65
Discharge: HOME | End: 2020-10-09
Attending: PHYSICAL MEDICINE & REHABILITATION
Payer: COMMERCIAL

## 2020-10-09 PROCEDURE — 72148 MRI LUMBAR SPINE W/O DYE: CPT

## 2020-11-03 NOTE — PROGRESS NOTES
[facility]  [address], [facility] [address]  Phone: [phone]  Fax: [phone]    Pt's YOB: 1955    ASSESSMENT   Diagnoses and all orders for this visit:    1. Lumbar pars defect  -     REFERRAL TO PHYSICAL THERAPY    2. Lumbar facet arthropathy  -     REFERRAL TO PHYSICAL THERAPY    3. Muscle spasm  -     REFERRAL TO PHYSICAL THERAPY    4. Bilateral primary osteoarthritis of hip         IMPRESSION AND PLAN:  Everardo Das III is a 60 y.o. male with history of lumbar pain. He was unable to attend physical therapy since his last office visit. Pt reports pain in the lower back that worsens with prolonged sitting but notes minimal pain over the last 1.5 weeks. He reports improvement with chiropractic adjustments 1-2 x a week. 1) Pt was given information on lumbar arthritis exercises. 2) He may take his previously prescribed Medrol Dosepak and Voltaren 75 mg if needed. 3) Pt was referred to physical therapy at [facility] on [address] [place] [address],[address]. 4) He was counseled on proper lifting mechanics. 5) I recommended that the patient lose weight and he was given information on the Mediterranean diet. 6) Mr. Lianne Haro has a reminder for a \"due or due soon\" health maintenance. I have asked that he contact his primary care provider, Liam Freitas MD, for follow-up on this health maintenance. 7)  demonstrated consistency with prescribing. Follow-up and Dispositions    · Return in about 6 weeks (around 12/16/2020) for PT follow up. HISTORY OF PRESENT ILLNESS:  Megha Rice is a 60 y.o. male with history of lumbar pain and presents to the office today for follow up. He was unable to attend physical therapy since his last office visit. Pt notes that he called [facility] and left several messages to schedule therapy but his calls were never answered.  He wishes to attend physical therapy at a different facility. Pt reports pain in the lower back that worsens with prolonged sitting. He notes that he previously experienced pain extending into the groin and radiating down both legs when his pain was severe, but he has not recently experienced this pain. Pt reports that he undergoes chiropractic adjustments 1-2 x a week with improvement and notes minimal pain over the last 1.5 weeks. Pt did not take the Medrol Dosepak or Voltaren 75 mg prescribed at his last office visit. Pt at this time desires to proceed with physical therapy. Of note, he is a nonsmoker. Pt notes that he has started walking (about 3 miles every morning). Pain Scale: 5/10    PCP: Ulises Vanessa MD     Past Medical History:   Diagnosis Date    ACL (anterior cruciate ligament) tear 8/10/2010    Carpal tunnel syndrome, bilateral 5/13/2015    GERD (gastroesophageal reflux disease) 8/10/2010    HLD (hyperlipidemia) 8/10/2010    Knee pain, right 8/10/2010    Squamous cell carcinoma 12/13/2011    face    Venous reflux         Social History     Socioeconomic History    Marital status:      Spouse name: Not on file    Number of children: Not on file    Years of education: Not on file    Highest education level: Not on file   Occupational History    Not on file   Social Needs    Financial resource strain: Not on file    Food insecurity     Worry: Not on file     Inability: Not on file   [employer] needs     Medical: Not on file     Non-medical: Not on file   Tobacco Use    Smoking status: Current Some Day Smoker     Types: Cigars    Smokeless tobacco: Never Used    Tobacco comment: once a week   Substance and Sexual Activity    Alcohol use:  Yes     Alcohol/week: 28.0 standard drinks     Types: 28 Cans of beer per week     Frequency: 4 or more times a week     Drinks per session: 1 or 2     Binge frequency: Monthly    Drug use: No    Sexual activity: Yes     Partners: Female     Birth control/protection: None Lifestyle    Physical activity     Days per week: Not on file     Minutes per session: Not on file    Stress: Not on file   Relationships    Social connections     Talks on phone: Not on file     Gets together: Not on file     Attends Mor[employer] service: Not on file     Active member of club or organization: Not on file     Attends meetings of clubs or organizations: Not on file     Relationship status: Not on file    Intimate partner violence     Fear of current or ex partner: Not on file     Emotionally abused: Not on file     Physically abused: Not on file     Forced sexual activity: Not on file   Other Topics Concern    Not on file   Social History Narrative    Not on file       Current Outpatient Medications   Medication Sig Dispense Refill    diclofenac EC (VOLTAREN) 75 mg EC tablet Take 1 Tab by mouth two (2) times daily (with meals). 60 Tab 1    amLODIPine (NORVASC) 5 mg tablet TAKE 1 TABLET BY MOUTH DAILY 30 Tab 6    atorvastatin (LIPITOR) 20 mg tablet Take 1 Tab by mouth daily. 90 Tab 2    olmesartan (BENICAR) 20 mg tablet Take 40 mg by mouth daily. Patient is not prescribed this medication, took 2 pills on Sunday and 2 pills on Monday.  losartan-hydroCHLOROthiazide (HYZAAR) 100-25 mg per tablet TAKE 1 TABLET BY MOUTH DAILY 90 Tab 5    testosterone cypionate (DEPOTESTOTERONE CYPIONATE) 200 mg/mL injection INJECT 200 MG IN THE MUSCLE EVERY 4 WEEKS (Patient taking differently: 200 mg by IntraMUSCular route every fourteen (14) days. INJECT 200 MG IN THE MUSCLE EVERY 4 WEEKS) 3 mL 1    Syringe, Disposable, 1 mL syrg Inject testosterone as directed. 100 Syringe 1    Safety Needles (NEEDLE, DISP, 18 G) 18 x 1 1/2 \" ndle Use as directed to inject testosterone. 100 Each 1    omeprazole (PRILOSEC OTC) 20 mg tablet Take 20 mg by mouth daily.  gabapentin (Neurontin) 100 mg capsule Take 1 cap by mouth at night for 1 week, then increase to 2 as directed.  (Patient not taking: Reported on 11/4/2020) 60 Cap 1    methylPREDNISolone (MEDROL DOSEPACK) 4 mg tablet Per dose pack instructions (Patient not taking: Reported on 11/4/2020) 1 Dose Pack 0    tadalafil (CIALIS) 20 mg tablet Take 1 Tab by mouth once over twenty-four (24) hours. 6 Tab 0       No Known Allergies      REVIEW OF SYSTEMS    Constitutional: Negative for fever, chills, or weight change. Respiratory: Negative for cough or shortness of breath. Cardiovascular: Negative for chest pain or palpitations. Gastrointestinal: Negative for acid reflux, change in bowel habits, or constipation. Genitourinary: Negative for dysuria and flank pain. Musculoskeletal: Positive for lumbar pain. Neurological: Negative for headaches, dizziness, or numbness. Psychiatric/Behavioral: Negative for difficulty with sleep. As per HPI    PHYSICAL EXAMINATION  Visit Vitals  /71   Pulse 80   Temp 97.3 °F (36.3 °C) (Temporal)   Resp 16   Ht 5' 8\" (1.727 m)   Wt 219 lb 6.4 oz (99.5 kg)   SpO2 97%   BMI 33.36 kg/m²       Constitutional: Awake, alert, and in no acute distress. Neurological: 1+ symmetrical DTRs in the upper extremities. 1+ symmetrical DTRs in the lower extremities. Sensation to light touch is intact. Negative Gar's sign bilaterally. Skin: warm, dry, and intact. Musculoskeletal: Tenderness to palpation in the lumbar region. No pain with extension or axial loading. Pain with forward flexion. Mild decreased range of motion with internal rotation of his left hip. Negative straight leg raise bilaterally.       Hip Flex  Quads Hamstrings Ankle DF EHL Ankle PF   Right +4/5 +4/5 +4/5 +4/5 +4/5 +4/5   Left +4/5 +4/5 +4/5 +4/5 +4/5 +4/5     IMAGING:    Lumbar spine MRI from 10/5/2020 was personally reviewed with the patient and demonstrated:  Results from Orders Only encounter on 10/05/20   MRI LUMB SPINE WO CONT    Narrative EXAMINATION: MRI lumbar spine without contrast    INDICATION: Increased lumbar pain, bilateral sciatica    COMPARISON: None    TECHNIQUE: Sagittal and axial multiecho MRI sequences of the lumbar spine  performed without contrast.    FINDINGS:    General: Vertebral body heights preserved. L4-L5 and L5-S1 mild disc space loss,  multilevel mild endplate spurring. L5 bilateral pars defects with grade 1 L5-S1  anterolisthesis. Lumbar lordosis maintained. Conus ends at level L1-L2. No  abnormal signal in the distal cord. No abnormal epidural collection identified. No suspicious marrow lesions. Miscellaneous: No incidental significant findings outside of the lumbar spine  region. Levels:    T10-T11, T11-T12: Sagittal plane only. No significant degenerative change or  stenosis. T12-L1: No significant degenerative change or stenosis. L1-L2: Minimal disc bulge. Mild facet of the. No significant stenosis. L2-L3: No significant disc disease. Mild facet arthropathy. No significant  stenosis. L3-L4: No significant disc disease. Mild facet arthropathy. No significant  stenosis. L4-L5: Mild disc/osteophyte bulge. Mild facet arthropathy. Spinal canal patent. Minimal foraminal narrowing. L5-S1: L5 pars defects, grade 1 anterolisthesis, with uncovering of disc and  mild disc bulge. Mild facet arthropathy. Spinal canal patent. Mild to moderate  foraminal stenoses with abutment of the exiting L5 nerves. Imaged sacrum: Unremarkable. Impression IMPRESSION:    At L5-S1, bilateral L5 pars defects results in grade 1 anterolisthesis with  associated degenerative changes resulting in mild to moderate foraminal stenoses  with abutment of the exiting L5 nerves. Spinal canal patent at this level.  -Above L5 level, degenerative changes do not result in significant stenosis. See level by level details above. Written by Jewels Franks, as dictated by Meche Jeronimo MD.  I, Dr. Meche Jeronimo confirm that all documentation is accurate.

## 2020-11-04 ENCOUNTER — OFFICE VISIT (OUTPATIENT)
Dept: ORTHOPEDIC SURGERY | Age: 65
End: 2020-11-04
Payer: COMMERCIAL

## 2020-11-04 VITALS
HEART RATE: 80 BPM | HEIGHT: 68 IN | SYSTOLIC BLOOD PRESSURE: 120 MMHG | TEMPERATURE: 97.3 F | DIASTOLIC BLOOD PRESSURE: 71 MMHG | BODY MASS INDEX: 33.25 KG/M2 | WEIGHT: 219.4 LBS | RESPIRATION RATE: 16 BRPM | OXYGEN SATURATION: 97 %

## 2020-11-04 DIAGNOSIS — M47.816 LUMBAR FACET ARTHROPATHY: ICD-10-CM

## 2020-11-04 DIAGNOSIS — M62.838 MUSCLE SPASM: ICD-10-CM

## 2020-11-04 DIAGNOSIS — M16.0 BILATERAL PRIMARY OSTEOARTHRITIS OF HIP: ICD-10-CM

## 2020-11-04 DIAGNOSIS — M43.06 LUMBAR PARS DEFECT: Primary | ICD-10-CM

## 2020-11-04 PROCEDURE — 99213 OFFICE O/P EST LOW 20 MIN: CPT | Performed by: PHYSICAL MEDICINE & REHABILITATION

## 2020-11-04 NOTE — PROGRESS NOTES
Aelisha De La Rosa presents today for   Chief Complaint   Patient presents with    LOW BACK PAIN    Buttocks pain     bilateral    Groin Pain     bilateral    Leg Pain     bilateral    Follow-up     MRI Lumbar f/u       Is someone accompanying this pt? no    Is the patient using any DME equipment during OV? no    Depression Screening:  3 most recent PHQ Screens 11/4/2020   Little interest or pleasure in doing things Not at all   Feeling down, depressed, irritable, or hopeless Not at all   Total Score PHQ 2 0       Learning Assessment:  Learning Assessment 10/26/2018   PRIMARY LEARNER Patient   HIGHEST LEVEL OF EDUCATION - PRIMARY LEARNER  -   PRIMARY LANGUAGE ENGLISH   LEARNER PREFERENCE PRIMARY LISTENING   ANSWERED BY patient   RELATIONSHIP SELF       Abuse Screening:  Abuse Screening Questionnaire 11/4/2020   Do you ever feel afraid of your partner? N   Are you in a relationship with someone who physically or mentally threatens you? N   Is it safe for you to go home? Y       OPIOID RISK TOOL  No flowsheet data found. Pt currently taking Antiplatelet therapy? no    Coordination of Care:  1. Have you been to the ER, urgent care clinic since your last visit? no  Hospitalized since your last visit? no    2. Have you seen or consulted any other health care providers outside of the [address] [place] [address] since your last visit? Yes, Chiropractor Include any pap smears or colon screening.

## 2020-11-04 NOTE — PATIENT INSTRUCTIONS
Low Back Arthritis: Exercises Introduction Here are some examples of typical rehabilitation exercises for your condition. Start each exercise slowly. Ease off the exercise if you start to have pain. Your doctor or physical therapist will tell you when you can start these exercises and which ones will work best for you. When you are not being active, find a comfortable position for rest. Some people are comfortable on the floor or a medium-firm bed with a small pillow under their head and another under their knees. Some people prefer to lie on their side with a pillow between their knees. Don't stay in one position for too long. Take short walks (10 to 20 minutes) every 2 to 3 hours. Avoid slopes, hills, and stairs until you feel better. Walk only distances you can manage without pain, especially leg pain. How to do the exercises Pelvic tilt 1. Lie on your back with your knees bent. 2. \"Brace\" your stomachtighten your muscles by pulling in and imagining your belly button moving toward your spine. 3. Press your lower back into the floor. You should feel your hips and pelvis rock back. 4. Hold for 6 seconds while breathing smoothly. 5. Relax and allow your pelvis and hips to rock forward. 6. Repeat 8 to 12 times. Back stretches 1. Get down on your hands and knees on the floor. 2. Relax your head and allow it to droop. Round your back up toward the ceiling until you feel a nice stretch in your upper, middle, and lower back. Hold this stretch for as long as it feels comfortable, or about 15 to 30 seconds. 3. Return to the starting position with a flat back while you are on your hands and knees. 4. Let your back sway by pressing your stomach toward the floor. Lift your buttocks toward the ceiling. 5. Hold this position for 15 to 30 seconds. 6. Repeat 2 to 4 times. Follow-up care is a key part of your treatment and safety.  Be sure to make and go to all appointments, and call your doctor if you are having problems. It's also a good idea to know your test results and keep a list of the medicines you take. Where can you learn more? Go to http://www.gray.com/ Enter L741 in the search box to learn more about \"Low Back Arthritis: Exercises. \" Current as of: March 2, 2020               Content Version: 12.6 © 2006-2020 [employer]. Care instructions adapted under license by [employer] (which disclaims liability or warranty for this information). If you have questions about a medical condition or this instruction, always ask your healthcare professional. [address] any warranty or liability for your use of this information. Learning About the [address] Diet What is the Mediterranean diet? The Mediterranean diet is a style of eating rather than a diet plan. It features foods eaten in [place], [place], [place] and [place], and other countries along the [facility]. It emphasizes eating foods like fish, fruits, vegetables, beans, high-fiber breads and whole grains, nuts, and olive oil. This style of eating includes limited red meat, cheese, and sweets. Why choose the Mediterranean diet? A Mediterranean-style diet may improve heart health. It contains more fat than other heart-healthy diets. But the fats are mainly from nuts, unsaturated oils (such as fish oils and olive oil), and certain nut or seed oils (such as canola, soybean, or flaxseed oil). These fats may help protect the heart and blood vessels. How can you get started on the Mediterranean diet? Here are some things you can do to switch to a more Mediterranean way of eating. What to eat · Eat a variety of fruits and vegetables each day, such as grapes, blueberries, tomatoes, broccoli, peppers, figs, olives, spinach, eggplant, beans, lentils, and chickpeas. · Eat a variety of whole-grain foods each day, such as oats, brown rice, and whole wheat bread, pasta, and couscous. · Eat fish at least 2 times a week. Try tuna, salmon, mackerel, lake trout, herring, or sardines. · Eat moderate amounts of low-fat dairy products, such as milk, cheese, or yogurt. · Eat moderate amounts of poultry and eggs. · Choose healthy (unsaturated) fats, such as nuts, olive oil, and certain nut or seed oils like canola, soybean, and flaxseed. · Limit unhealthy (saturated) fats, such as butter, palm oil, and coconut oil. And limit fats found in animal products, such as meat and dairy products made with whole milk. Try to eat red meat only a few times a month in very small amounts. · Limit sweets and desserts to only a few times a week. This includes sugar-sweetened drinks like soda. The Mediterranean diet may also include red wine with your meal1 glass each day for women and up to 2 glasses a day for men. Tips for eating at home · Use herbs, spices, garlic, lemon zest, and citrus juice instead of salt to add flavor to foods. · Add avocado slices to your sandwich instead of urena. · Have fish for lunch or dinner instead of red meat. Brush the fish with olive oil, and broil or grill it. · Sprinkle your salad with seeds or nuts instead of cheese. · Cook with olive or canola oil instead of butter or oils that are high in saturated fat. · Switch from 2% milk or whole milk to 1% or fat-free milk. · Dip raw vegetables in a vinaigrette dressing or hummus instead of dips made from mayonnaise or sour cream. 
· Have a piece of fruit for dessert instead of a piece of cake. Try baked apples, or have some dried fruit. Tips for eating out · Try broiled, grilled, baked, or poached fish instead of having it fried or breaded. · Ask your  to have your meals prepared with olive oil instead of butter. · Order dishes made with marinara sauce or sauces made from olive oil. Avoid sauces made from cream or mayonnaise. · Choose whole-grain breads, whole wheat pasta and pizza crust, brown rice, beans, and lentils. · Cut back on butter or margarine on bread. Instead, you can dip your bread in a small amount of olive oil. · Ask for a side salad or grilled vegetables instead of french fries or chips. Where can you learn more? Go to http://www.Se[employer] Adore/ Enter 786-608-6935 in the search box to learn more about \"Learning About the Mediterranean Diet. \" Current as of: August 22, 2019               Content Version: 12.6 © 8056-1431 [employer]. Care instructions adapted under license by [employer] (which disclaims liability or warranty for this information). If you have questions about a medical condition or this instruction, always ask your healthcare professional. [address] any warranty or liability for your use of this information.

## 2020-12-23 RX ORDER — AMLODIPINE BESYLATE 5 MG/1
TABLET ORAL
Qty: 30 TAB | Refills: 6 | Status: SHIPPED | OUTPATIENT
Start: 2020-12-23 | End: 2021-08-20

## 2020-12-24 RX ORDER — LOSARTAN POTASSIUM AND HYDROCHLOROTHIAZIDE 25; 100 MG/1; MG/1
TABLET ORAL
Qty: 90 TAB | Refills: 5 | Status: SHIPPED | OUTPATIENT
Start: 2020-12-24 | End: 2022-01-03

## 2021-06-11 ENCOUNTER — OFFICE VISIT (OUTPATIENT)
Dept: CARDIOLOGY CLINIC | Age: 66
End: 2021-06-11
Payer: COMMERCIAL

## 2021-06-11 VITALS
BODY MASS INDEX: 33.04 KG/M2 | HEART RATE: 72 BPM | TEMPERATURE: 98.1 F | DIASTOLIC BLOOD PRESSURE: 73 MMHG | WEIGHT: 218 LBS | HEIGHT: 68 IN | SYSTOLIC BLOOD PRESSURE: 116 MMHG | OXYGEN SATURATION: 96 %

## 2021-06-11 DIAGNOSIS — R94.31 ABNORMAL EKG: Primary | ICD-10-CM

## 2021-06-11 DIAGNOSIS — I87.2 VENOUS REFLUX: ICD-10-CM

## 2021-06-11 DIAGNOSIS — I10 HYPERTENSION, UNSPECIFIED TYPE: ICD-10-CM

## 2021-06-11 DIAGNOSIS — E78.5 HYPERLIPIDEMIA, UNSPECIFIED HYPERLIPIDEMIA TYPE: ICD-10-CM

## 2021-06-11 PROCEDURE — 93000 ELECTROCARDIOGRAM COMPLETE: CPT | Performed by: INTERNAL MEDICINE

## 2021-06-11 PROCEDURE — 99214 OFFICE O/P EST MOD 30 MIN: CPT | Performed by: INTERNAL MEDICINE

## 2021-06-11 RX ORDER — ATORVASTATIN CALCIUM 20 MG/1
20 TABLET, FILM COATED ORAL DAILY
Qty: 90 TABLET | Refills: 2 | Status: SHIPPED | OUTPATIENT
Start: 2021-06-11 | End: 2021-09-27

## 2021-06-11 NOTE — PROGRESS NOTES
HISTORY OF PRESENT ILLNESS  Mitzi More is a 63 y.o. male. Hypertension  The history is provided by the patient. This is a chronic problem. The current episode started more than 1 week ago. The problem occurs daily. The problem has been gradually improving. Pertinent negatives include no shortness of breath. Cholesterol Problem  Pertinent negatives include no shortness of breath. Review of Systems   Constitutional: Negative for chills, diaphoresis, fever, malaise/fatigue and weight loss. HENT: Negative for congestion, ear discharge, ear pain, hearing loss, nosebleeds, sore throat and tinnitus. Eyes: Negative for blurred vision. Respiratory: Negative for cough, hemoptysis, sputum production, shortness of breath, wheezing and stridor. Cardiovascular: Negative for palpitations, orthopnea, claudication, leg swelling and PND. Gastrointestinal: Negative for heartburn, nausea and vomiting. Musculoskeletal: Negative for myalgias and neck pain. Skin: Negative for itching and rash. Neurological: Negative for dizziness, tingling, tremors, focal weakness, loss of consciousness and weakness. Psychiatric/Behavioral: Negative for depression and suicidal ideas.      Family History   Problem Relation Age of Onset    High Cholesterol Mother     High Cholesterol Father    Bossman Santoyo Arthritis-rheumatoid Father     Heart Disease Father         CAD    Cancer Father         prostate in [de-identified] Crohn's Disease Sister        Past Medical History:   Diagnosis Date    ACL (anterior cruciate ligament) tear 8/10/2010    Carpal tunnel syndrome, bilateral 5/13/2015    GERD (gastroesophageal reflux disease) 8/10/2010    HLD (hyperlipidemia) 8/10/2010    Knee pain, right 8/10/2010    Squamous cell carcinoma 12/13/2011    face    Venous reflux        Past Surgical History:   Procedure Laterality Date    BIOPSY  2011    face - squamous cell     HX COLONOSCOPY  2008    f/u 2018    HX ORTHOPAEDIC  11or10 of 2008    right hand        Social History     Tobacco Use    Smoking status: Current Some Day Smoker     Types: Cigars    Smokeless tobacco: Never Used    Tobacco comment: once a week   Substance Use Topics    Alcohol use: Yes     Alcohol/week: 28.0 standard drinks     Types: 28 Cans of beer per week     Comment: occ       No Known Allergies    Outpatient Medications Marked as Taking for the 6/11/21 encounter (Office Visit) with Ainsley Pineda MD   Medication Sig Dispense Refill    losartan-hydroCHLOROthiazide (HYZAAR) 100-25 mg per tablet TAKE 1 TABLET BY MOUTH DAILY 90 Tab 5    amLODIPine (NORVASC) 5 mg tablet TAKE 1 TABLET BY MOUTH DAILY 30 Tab 6    diclofenac EC (VOLTAREN) 75 mg EC tablet Take 1 Tab by mouth two (2) times daily (with meals). (Patient taking differently: Take 75 mg by mouth as needed.) 60 Tab 1    atorvastatin (LIPITOR) 20 mg tablet Take 1 Tab by mouth daily. 90 Tab 2    Syringe, Disposable, 1 mL syrg Inject testosterone as directed. 100 Syringe 1    Safety Needles (NEEDLE, DISP, 18 G) 18 x 1 1/2 \" ndle Use as directed to inject testosterone. 100 Each 1    omeprazole (PRILOSEC OTC) 20 mg tablet Take 20 mg by mouth daily. Visit Vitals  /73 (BP 1 Location: Left arm, BP Patient Position: Sitting, BP Cuff Size: Large adult)   Pulse 72   Temp 98.1 °F (36.7 °C) (Temporal)   Ht 5' 8\" (1.727 m)   Wt 98.9 kg (218 lb)   SpO2 96%   BMI 33.15 kg/m²       Physical Exam  Constitutional:       General: He is not in acute distress. Appearance: He is well-developed. He is not diaphoretic. HENT:      Head: Atraumatic. Mouth/Throat:      Pharynx: No oropharyngeal exudate. Eyes:      General: No scleral icterus. Right eye: No discharge. Left eye: No discharge. Conjunctiva/sclera: Conjunctivae normal.   Neck:      Thyroid: No thyromegaly. Vascular: No JVD. Trachea: No tracheal deviation.    Cardiovascular:      Rate and Rhythm: Normal rate and regular rhythm. Heart sounds: Normal heart sounds. No murmur heard. No friction rub. No gallop. Pulmonary:      Effort: Pulmonary effort is normal. No respiratory distress. Breath sounds: Normal breath sounds. No stridor. No wheezing or rales. Chest:      Chest wall: No tenderness. Abdominal:      Palpations: Abdomen is soft. Tenderness: There is no abdominal tenderness. There is no guarding or rebound. Musculoskeletal:         General: No tenderness. Normal range of motion. Cervical back: Normal range of motion and neck supple. Comments: Has varicose veins in the right lower extremity   Lymphadenopathy:      Cervical: No cervical adenopathy. Skin:     General: Skin is warm. Neurological:      Mental Status: He is alert and oriented to person, place, and time. Motor: No abnormal muscle tone. Psychiatric:         Behavior: Behavior normal.       ekg sinus rhythm with left atrial enlargement. ASSESSMENT and PLAN    ICD-10-CM ICD-9-CM    1. Hypertension, unspecified type  I10 401.9 AMB POC EKG ROUTINE W/ 12 LEADS, INTER & REP     Orders Placed This Encounter    AMB POC EKG ROUTINE W/ 12 LEADS, INTER & REP     Order Specific Question:   Reason for Exam:     Answer:   HTN       current treatment plan is effective, no change in therapy  reviewed diet, exercise and weight control  cardiovascular risk and specific lipid/LDL goals reviewed. Coronary calcium scoring in 2018-  Was 1    Seen for follow-up. Currently on losartan/hydrochlorthiazide 100/25 mg daily and Norvasc 5 mg. ET good with no chest pain or sob. Continue current meds and advised weight reduction. Will switch to lipitor due to muscle pain.   F/u in 1 yr

## 2021-06-11 NOTE — PROGRESS NOTES
1. Have you been to the ER, urgent care clinic since your last visit? Hospitalized since your last visit? Yes When: 06/07/21 Where: Urgent Care Reason for visit: Vertigo    2. Have you seen or consulted any other health care providers outside of the [address] [place] [address] since your last visit? Include any pap smears or colon screening. No     3. Do you need any refills today?  Yes

## 2021-08-20 RX ORDER — AMLODIPINE BESYLATE 5 MG/1
TABLET ORAL
Qty: 30 TABLET | Refills: 6 | Status: SHIPPED | OUTPATIENT
Start: 2021-08-20 | End: 2022-01-03

## 2021-09-27 RX ORDER — ATORVASTATIN CALCIUM 20 MG/1
20 TABLET, FILM COATED ORAL DAILY
Qty: 90 TABLET | Refills: 2 | Status: SHIPPED | OUTPATIENT
Start: 2021-09-27 | End: 2022-02-01 | Stop reason: SDUPTHER

## 2022-01-03 RX ORDER — AMLODIPINE BESYLATE 5 MG/1
TABLET ORAL
Qty: 30 TABLET | Refills: 6 | Status: SHIPPED | OUTPATIENT
Start: 2022-01-03 | End: 2022-02-01 | Stop reason: SDUPTHER

## 2022-02-01 ENCOUNTER — OFFICE VISIT (OUTPATIENT)
Dept: CARDIOLOGY CLINIC | Age: 67
End: 2022-02-01
Payer: COMMERCIAL

## 2022-02-01 VITALS
BODY MASS INDEX: 34.56 KG/M2 | WEIGHT: 228 LBS | SYSTOLIC BLOOD PRESSURE: 136 MMHG | HEART RATE: 78 BPM | DIASTOLIC BLOOD PRESSURE: 77 MMHG | HEIGHT: 68 IN | OXYGEN SATURATION: 96 %

## 2022-02-01 DIAGNOSIS — E66.9 OBESITY (BMI 35.0-39.9 WITHOUT COMORBIDITY): ICD-10-CM

## 2022-02-01 DIAGNOSIS — E78.5 HYPERLIPIDEMIA, UNSPECIFIED HYPERLIPIDEMIA TYPE: ICD-10-CM

## 2022-02-01 DIAGNOSIS — I87.2 VENOUS REFLUX: ICD-10-CM

## 2022-02-01 DIAGNOSIS — I10 HYPERTENSION, UNSPECIFIED TYPE: Primary | ICD-10-CM

## 2022-02-01 DIAGNOSIS — R94.31 ABNORMAL EKG: ICD-10-CM

## 2022-02-01 PROCEDURE — 99214 OFFICE O/P EST MOD 30 MIN: CPT | Performed by: INTERNAL MEDICINE

## 2022-02-01 RX ORDER — AMLODIPINE BESYLATE 5 MG/1
5 TABLET ORAL DAILY
Qty: 30 TABLET | Refills: 6 | Status: SHIPPED | OUTPATIENT
Start: 2022-02-01 | End: 2022-09-12 | Stop reason: SDUPTHER

## 2022-02-01 RX ORDER — LOSARTAN POTASSIUM AND HYDROCHLOROTHIAZIDE 25; 100 MG/1; MG/1
1 TABLET ORAL DAILY
Qty: 30 TABLET | Refills: 6 | Status: SHIPPED | OUTPATIENT
Start: 2022-02-01 | End: 2022-09-12 | Stop reason: SDUPTHER

## 2022-02-01 RX ORDER — ATORVASTATIN CALCIUM 20 MG/1
20 TABLET, FILM COATED ORAL DAILY
Qty: 30 TABLET | Refills: 6 | Status: SHIPPED | OUTPATIENT
Start: 2022-02-01 | End: 2022-09-12 | Stop reason: SDUPTHER

## 2022-02-01 NOTE — PROGRESS NOTES
1. Have you been to the ER, urgent care clinic since your last visit? Hospitalized since your last visit? No    2. Have you seen or consulted any other health care providers outside of the [address] [place] [address] since your last visit? Include any pap smears or colon screening. Yes Where: [facility]     3. Do you need any refills today?   Yes

## 2022-02-01 NOTE — PROGRESS NOTES
HISTORY OF PRESENT ILLNESS  Og Echols is a 61 y.o. male. Hypertension  The history is provided by the patient. This is a chronic problem. The current episode started more than 1 week ago. The problem occurs daily. The problem has been gradually improving. Review of Systems   Constitutional: Negative for chills, diaphoresis, fever, malaise/fatigue and weight loss. HENT: Negative for congestion, ear discharge, ear pain, hearing loss, nosebleeds, sore throat and tinnitus. Eyes: Negative for blurred vision. Respiratory: Negative for cough, hemoptysis, sputum production, wheezing and stridor. Cardiovascular: Negative for palpitations, orthopnea, claudication, leg swelling and PND. Gastrointestinal: Negative for heartburn, nausea and vomiting. Musculoskeletal: Negative for myalgias and neck pain. Skin: Negative for itching and rash. Neurological: Negative for dizziness, tingling, tremors, focal weakness, loss of consciousness and weakness. Psychiatric/Behavioral: Negative for depression and suicidal ideas.      Family History   Problem Relation Age of Onset    High Cholesterol Mother     High Cholesterol Father    [employer] [place] Arthritis-rheumatoid Father     Heart Disease Father         CAD    Cancer Father         prostate in [de-identified] Crohn's Disease Sister        Past Medical History:   Diagnosis Date    ACL (anterior cruciate ligament) tear 8/10/2010    Carpal tunnel syndrome, bilateral 5/13/2015    GERD (gastroesophageal reflux disease) 8/10/2010    HLD (hyperlipidemia) 8/10/2010    Knee pain, right 8/10/2010    Squamous cell carcinoma 12/13/2011    face    Venous reflux        Past Surgical History:   Procedure Laterality Date    BIOPSY  2011    face - squamous cell     HX COLONOSCOPY  2008    f/u 2018    HX ORTHOPAEDIC  11or10 of 2008    right hand        Social History     Tobacco Use    Smoking status: Current Some Day Smoker     Types: Cigars    Smokeless tobacco: Never Used    Tobacco comment: once a week   Substance Use Topics    Alcohol use: Yes     Alcohol/week: 28.0 standard drinks     Types: 28 Cans of beer per week     Comment: occ       No Known Allergies    Outpatient Medications Marked as Taking for the 2/1/22 encounter (Office Visit) with Nikki Foster MD   Medication Sig Dispense Refill    losartan-hydroCHLOROthiazide (HYZAAR) 100-25 mg per tablet Take 1 Tablet by mouth daily. 30 Tablet 6    amLODIPine (NORVASC) 5 mg tablet Take 1 Tablet by mouth daily. 30 Tablet 6    atorvastatin (LIPITOR) 20 mg tablet Take 1 Tablet by mouth daily. 30 Tablet 6    testosterone cypionate (DEPOTESTOTERONE CYPIONATE) 200 mg/mL injection INJECT 200 MG IN THE MUSCLE EVERY 4 WEEKS (Patient taking differently: 200 mg by IntraMUSCular route every thirty (30) days. INJECT 200 MG IN THE MUSCLE EVERY 3 WEEKS) 3 mL 1    Syringe, Disposable, 1 mL syrg Inject testosterone as directed. 100 Syringe 1    Safety Needles (NEEDLE, DISP, 18 G) 18 x 1 1/2 \" ndle Use as directed to inject testosterone. 100 Each 1        Visit Vitals  /77 (BP 1 Location: Left upper arm, BP Patient Position: Sitting, BP Cuff Size: Adult)   Pulse 78   Ht 5' 8\" (1.727 m)   Wt 103.4 kg (228 lb)   SpO2 96%   BMI 34.67 kg/m²       Physical Exam  Constitutional:       General: He is not in acute distress. Appearance: He is well-developed. He is not diaphoretic. HENT:      Head: Atraumatic. Mouth/Throat:      Pharynx: No oropharyngeal exudate. Eyes:      General: No scleral icterus. Right eye: No discharge. Left eye: No discharge. Conjunctiva/sclera: Conjunctivae normal.   Neck:      Thyroid: No thyromegaly. Vascular: No JVD. Trachea: No tracheal deviation. Cardiovascular:      Rate and Rhythm: Normal rate and regular rhythm. Heart sounds: Normal heart sounds. No murmur heard. No friction rub. No gallop.     Pulmonary:      Effort: Pulmonary effort is normal. No respiratory distress. Breath sounds: Normal breath sounds. No stridor. No wheezing or rales. Chest:      Chest wall: No tenderness. Abdominal:      Palpations: Abdomen is soft. Tenderness: There is no abdominal tenderness. There is no guarding or rebound. Musculoskeletal:         General: No tenderness. Normal range of motion. Cervical back: Normal range of motion and neck supple. Comments: Has varicose veins in the right lower extremity   Lymphadenopathy:      Cervical: No cervical adenopathy. Skin:     General: Skin is warm. Neurological:      Mental Status: He is alert and oriented to person, place, and time. Motor: No abnormal muscle tone. Psychiatric:         Behavior: Behavior normal.       ekg sinus rhythm with left atrial enlargement. ASSESSMENT and PLAN    ICD-10-CM ICD-9-CM    1. Hypertension, unspecified type  I10 401.9    2. Abnormal EKG  R94.31 794.31    3. Hyperlipidemia, unspecified hyperlipidemia type  E78.5 272.4    4. Obesity (BMI 35.0-39.9 without comorbidity)  E66.9 278.00    5. Venous reflux  I87.2 459.81      Orders Placed This Encounter    losartan-hydroCHLOROthiazide (HYZAAR) 100-25 mg per tablet     Sig: Take 1 Tablet by mouth daily. Dispense:  30 Tablet     Refill:  6     THIS REFILL IS  APPROVED. APPOINTMENT WILL BE REQUIRED BEFORE NEXT REFILL IS  APPROVED.  amLODIPine (NORVASC) 5 mg tablet     Sig: Take 1 Tablet by mouth daily. Dispense:  30 Tablet     Refill:  6    atorvastatin (LIPITOR) 20 mg tablet     Sig: Take 1 Tablet by mouth daily. Dispense:  30 Tablet     Refill:  6     Follow-up and Dispositions    · Return in about 1 year (around 2/1/2023). current treatment plan is effective, no change in therapy  reviewed diet, exercise and weight control  cardiovascular risk and specific lipid/LDL goals reviewed. Coronary calcium scoring in 2018-  Was 1    Seen for follow-up.   Currently on losartan/hydrochlorthiazide 100/25 mg daily and Norvasc 5 mg.   bp well controlled on current meds. Uses compression stocking for venous insufficiency. ET good with no chest pain or sob. Continue current meds and advised weight reduction.   F/u in 1 yr

## 2022-03-19 PROBLEM — I83.811 VARICOSE VEINS OF LEG WITH PAIN, RIGHT: Status: ACTIVE | Noted: 2018-10-05

## 2022-03-19 PROBLEM — I87.2 SAPHENOFEMORAL VENOUS REFLUX: Status: ACTIVE | Noted: 2018-10-05

## 2022-09-12 RX ORDER — ATORVASTATIN CALCIUM 20 MG/1
20 TABLET, FILM COATED ORAL DAILY
Qty: 90 TABLET | Refills: 1 | Status: SHIPPED | OUTPATIENT
Start: 2022-09-12 | End: 2022-10-26 | Stop reason: SDUPTHER

## 2022-09-12 RX ORDER — AMLODIPINE BESYLATE 5 MG/1
5 TABLET ORAL DAILY
Qty: 90 TABLET | Refills: 1 | Status: SHIPPED | OUTPATIENT
Start: 2022-09-12 | End: 2022-10-26 | Stop reason: SDUPTHER

## 2022-09-12 RX ORDER — LOSARTAN POTASSIUM AND HYDROCHLOROTHIAZIDE 25; 100 MG/1; MG/1
1 TABLET ORAL DAILY
Qty: 90 TABLET | Refills: 1 | Status: SHIPPED | OUTPATIENT
Start: 2022-09-12 | End: 2022-10-26 | Stop reason: SDUPTHER

## 2022-10-26 RX ORDER — AMLODIPINE BESYLATE 5 MG/1
5 TABLET ORAL DAILY
Qty: 90 TABLET | Refills: 1 | Status: SHIPPED | OUTPATIENT
Start: 2022-10-26

## 2022-10-26 RX ORDER — ATORVASTATIN CALCIUM 20 MG/1
20 TABLET, FILM COATED ORAL DAILY
Qty: 90 TABLET | Refills: 1 | Status: SHIPPED | OUTPATIENT
Start: 2022-10-26

## 2022-10-26 RX ORDER — LOSARTAN POTASSIUM AND HYDROCHLOROTHIAZIDE 25; 100 MG/1; MG/1
1 TABLET ORAL DAILY
Qty: 90 TABLET | Refills: 1 | Status: SHIPPED | OUTPATIENT
Start: 2022-10-26

## 2022-12-08 ENCOUNTER — APPOINTMENT (OUTPATIENT)
Dept: GENERAL RADIOLOGY | Age: 67
End: 2022-12-08
Attending: EMERGENCY MEDICINE
Payer: MEDICARE

## 2022-12-08 ENCOUNTER — HOSPITAL ENCOUNTER (EMERGENCY)
Age: 67
Discharge: HOME | End: 2022-12-08
Attending: EMERGENCY MEDICINE
Payer: MEDICARE

## 2022-12-08 VITALS
TEMPERATURE: 99.1 F | DIASTOLIC BLOOD PRESSURE: 81 MMHG | HEART RATE: 99 BPM | WEIGHT: 217 LBS | BODY MASS INDEX: 32.89 KG/M2 | RESPIRATION RATE: 18 BRPM | HEIGHT: 68 IN | SYSTOLIC BLOOD PRESSURE: 137 MMHG | OXYGEN SATURATION: 100 %

## 2022-12-08 DIAGNOSIS — R11.0 NAUSEA WITHOUT VOMITING: ICD-10-CM

## 2022-12-08 DIAGNOSIS — R68.83 CHILLS: Primary | ICD-10-CM

## 2022-12-08 LAB
ALBUMIN SERPL-MCNC: 4 G/DL (ref 3.4–5)
ALBUMIN/GLOB SERPL: 1 {RATIO} (ref 0.8–1.7)
ALP SERPL-CCNC: 77 U/L (ref 45–117)
ALT SERPL-CCNC: 44 U/L (ref 16–61)
ANION GAP SERPL CALC-SCNC: 6 MMOL/L (ref 3–18)
APPEARANCE UR: CLEAR
AST SERPL-CCNC: 25 U/L (ref 10–38)
BASOPHILS # BLD: 0 K/UL (ref 0–0.1)
BASOPHILS NFR BLD: 0 % (ref 0–2)
BILIRUB SERPL-MCNC: 1.6 MG/DL (ref 0.2–1)
BILIRUB UR QL: NEGATIVE
BUN SERPL-MCNC: 9 MG/DL (ref 7–18)
BUN/CREAT SERPL: 8 (ref 12–20)
CALCIUM SERPL-MCNC: 8.8 MG/DL (ref 8.5–10.1)
CHLORIDE SERPL-SCNC: 94 MMOL/L (ref 100–111)
CO2 SERPL-SCNC: 31 MMOL/L (ref 21–32)
COLOR UR: YELLOW
COVID-19 RAPID TEST, COVR: NOT DETECTED
CREAT SERPL-MCNC: 1.1 MG/DL (ref 0.6–1.3)
DIFFERENTIAL METHOD BLD: ABNORMAL
EOSINOPHIL # BLD: 0 K/UL (ref 0–0.4)
EOSINOPHIL NFR BLD: 0 % (ref 0–5)
ERYTHROCYTE [DISTWIDTH] IN BLOOD BY AUTOMATED COUNT: 11.9 % (ref 11.6–14.5)
FLUAV AG NPH QL IA: NEGATIVE
FLUBV AG NOSE QL IA: NEGATIVE
GLOBULIN SER CALC-MCNC: 4.2 G/DL (ref 2–4)
GLUCOSE SERPL-MCNC: 115 MG/DL (ref 74–99)
GLUCOSE UR STRIP.AUTO-MCNC: NEGATIVE MG/DL
HCT VFR BLD AUTO: 44.3 % (ref 36–48)
HGB BLD-MCNC: 15.3 G/DL (ref 13–16)
HGB UR QL STRIP: ABNORMAL
IMM GRANULOCYTES # BLD AUTO: 0.1 K/UL (ref 0–0.04)
IMM GRANULOCYTES NFR BLD AUTO: 1 % (ref 0–0.5)
KETONES UR QL STRIP.AUTO: NEGATIVE MG/DL
LACTATE BLD-SCNC: 0.75 MMOL/L (ref 0.4–2)
LEUKOCYTE ESTERASE UR QL STRIP.AUTO: NEGATIVE
LYMPHOCYTES # BLD: 0.8 K/UL (ref 0.9–3.6)
LYMPHOCYTES NFR BLD: 6 % (ref 21–52)
MCH RBC QN AUTO: 31.8 PG (ref 24–34)
MCHC RBC AUTO-ENTMCNC: 34.5 G/DL (ref 31–37)
MCV RBC AUTO: 92.1 FL (ref 78–100)
MONOCYTES # BLD: 0.7 K/UL (ref 0.05–1.2)
MONOCYTES NFR BLD: 5 % (ref 3–10)
NEUTS SEG # BLD: 11.7 K/UL (ref 1.8–8)
NEUTS SEG NFR BLD: 88 % (ref 40–73)
NITRITE UR QL STRIP.AUTO: NEGATIVE
NRBC # BLD: 0 K/UL (ref 0–0.01)
NRBC BLD-RTO: 0 PER 100 WBC
PH UR STRIP: 5.5 [PH] (ref 5–8)
PLATELET # BLD AUTO: 322 K/UL (ref 135–420)
PMV BLD AUTO: 8.9 FL (ref 9.2–11.8)
POTASSIUM SERPL-SCNC: 3.1 MMOL/L (ref 3.5–5.5)
PROT SERPL-MCNC: 8.2 G/DL (ref 6.4–8.2)
PROT UR STRIP-MCNC: ABNORMAL MG/DL
RBC # BLD AUTO: 4.81 M/UL (ref 4.35–5.65)
RBC #/AREA URNS HPF: NORMAL /HPF (ref 0–5)
SODIUM SERPL-SCNC: 131 MMOL/L (ref 136–145)
SOURCE, COVRS: NORMAL
SP GR UR REFRACTOMETRY: 1.02 (ref 1–1.03)
TROPONIN-HIGH SENSITIVITY: 10 NG/L (ref 0–78)
UROBILINOGEN UR QL STRIP.AUTO: 1 EU/DL (ref 0.2–1)
WBC # BLD AUTO: 13.2 K/UL (ref 4.6–13.2)

## 2022-12-08 PROCEDURE — 81001 URINALYSIS AUTO W/SCOPE: CPT

## 2022-12-08 PROCEDURE — 83605 ASSAY OF LACTIC ACID: CPT

## 2022-12-08 PROCEDURE — 99284 EMERGENCY DEPT VISIT MOD MDM: CPT

## 2022-12-08 PROCEDURE — 87804 INFLUENZA ASSAY W/OPTIC: CPT

## 2022-12-08 PROCEDURE — 80053 COMPREHEN METABOLIC PANEL: CPT

## 2022-12-08 PROCEDURE — 84484 ASSAY OF TROPONIN QUANT: CPT

## 2022-12-08 PROCEDURE — 71046 X-RAY EXAM CHEST 2 VIEWS: CPT

## 2022-12-08 PROCEDURE — 74011250636 HC RX REV CODE- 250/636: Performed by: EMERGENCY MEDICINE

## 2022-12-08 PROCEDURE — 85025 COMPLETE CBC W/AUTO DIFF WBC: CPT

## 2022-12-08 PROCEDURE — 87635 SARS-COV-2 COVID-19 AMP PRB: CPT

## 2022-12-08 PROCEDURE — 87040 BLOOD CULTURE FOR BACTERIA: CPT

## 2022-12-08 RX ORDER — ONDANSETRON 4 MG/1
4 TABLET, ORALLY DISINTEGRATING ORAL
Status: COMPLETED | OUTPATIENT
Start: 2022-12-08 | End: 2022-12-08

## 2022-12-08 RX ORDER — ONDANSETRON 4 MG/1
8 TABLET, FILM COATED ORAL
Qty: 12 TABLET | Refills: 0 | Status: SHIPPED | OUTPATIENT
Start: 2022-12-08

## 2022-12-08 RX ADMIN — ONDANSETRON 4 MG: 4 TABLET, ORALLY DISINTEGRATING ORAL at 14:30

## 2022-12-08 NOTE — ED PROVIDER NOTES
EMERGENCY DEPARTMENT HISTORY AND PHYSICAL EXAM    Date: 12/8/2022  Patient Name: Nile Germain III    History of Presenting Illness     Chief Complaint   Patient presents with    Flu Like Symptoms         History Provided By: Patient    Additional History (Context): Jayy Davis is a 61 y.o. male with hypertension, hyperlipidemia, and GERD  who presents with complaint of fever and chills. He said that on Thanksgiving day he ate oysters. The next 48 hours he had marked stooling changes nausea abdominal pain. He went to an urgent care was placed on Cipro. He still on Cipro now for food borne illness. He denies any abdominal discomfort for several days after initiating Cipro but on last night symptoms return for fever and chills. Denies any respiratory or congestive complaints. Denies any abdominal pain. He says he is compliant primarily feeling nausea. Denies any melena or hematochezia with any of his stooling since Thanksgiving. Denies prior abdominal surgeries. Rectal pain or dysuria. Denies back pain. PCP: None    Current Outpatient Medications   Medication Sig Dispense Refill    ondansetron hcl (Zofran) 4 mg tablet Take 2 Tablets by mouth every eight (8) hours as needed for Nausea. 12 Tablet 0    losartan-hydroCHLOROthiazide (HYZAAR) 100-25 mg per tablet Take 1 Tablet by mouth daily. 90 Tablet 1    atorvastatin (LIPITOR) 20 mg tablet Take 1 Tablet by mouth daily. 90 Tablet 1    amLODIPine (NORVASC) 5 mg tablet Take 1 Tablet by mouth daily. 90 Tablet 1    testosterone cypionate (DEPOTESTOTERONE CYPIONATE) 200 mg/mL injection INJECT 200 MG IN THE MUSCLE EVERY 4 WEEKS (Patient taking differently: 200 mg by IntraMUSCular route every thirty (30) days. INJECT 200 MG IN THE MUSCLE EVERY 3 WEEKS) 3 mL 1    omeprazole (PRILOSEC OTC) 20 mg tablet Take 20 mg by mouth daily. gabapentin (Neurontin) 100 mg capsule Take 1 cap by mouth at night for 1 week, then increase to 2 as directed.  (Patient not taking: Reported on 11/4/2020) 60 Cap 1    diclofenac EC (VOLTAREN) 75 mg EC tablet Take 1 Tab by mouth two (2) times daily (with meals). (Patient not taking: Reported on 2/1/2022) 60 Tab 1    tadalafil (CIALIS) 20 mg tablet Take 1 Tab by mouth once over twenty-four (24) hours. (Patient not taking: Reported on 6/11/2021) 6 Tab 0    Syringe, Disposable, 1 mL syrg Inject testosterone as directed. 100 Syringe 1    Safety Needles (NEEDLE, DISP, 18 G) 18 x 1 1/2 \" ndle Use as directed to inject testosterone. 100 Each 1       Past History     Past Medical History:  Past Medical History:   Diagnosis Date    ACL (anterior cruciate ligament) tear 8/10/2010    Carpal tunnel syndrome, bilateral 5/13/2015    GERD (gastroesophageal reflux disease) 8/10/2010    HLD (hyperlipidemia) 8/10/2010    Knee pain, right 8/10/2010    Squamous cell carcinoma 12/13/2011    face    Venous reflux        Past Surgical History:  Past Surgical History:   Procedure Laterality Date    BIOPSY  2011    face - squamous cell     HX COLONOSCOPY  2008    f/u 2018    HX ORTHOPAEDIC  11or10 of 2008    right hand        Family History:  Family History   Problem Relation Age of Onset    High Cholesterol Mother     High Cholesterol Father     Arthritis-rheumatoid Father     Heart Disease Father         CAD    Cancer Father         prostate in [de-identified]    Crohn's Disease Sister        Social History:  Social History     Tobacco Use    Smoking status: Some Days     Types: Cigars    Smokeless tobacco: Never    Tobacco comments:     once a week   Substance Use Topics    Alcohol use: Not Currently     Comment: daily    Drug use: No       Allergies:  No Known Allergies      Review of Systems   Review of Systems   Constitutional:  Positive for chills, fatigue and fever. HENT:  Negative for congestion. Eyes: Negative. Respiratory:  Negative for cough and shortness of breath. Cardiovascular:  Negative for chest pain. Gastrointestinal:  Positive for nausea. Negative for abdominal pain, rectal pain and vomiting. Endocrine: Negative. Genitourinary: Negative. Negative for dysuria and flank pain. Musculoskeletal:  Negative for back pain, neck pain and neck stiffness. Skin:  Negative for rash. Allergic/Immunologic: Negative. Neurological: Negative. Negative for headaches. Hematological: Negative. Psychiatric/Behavioral: Negative. All Other Systems Negative  Physical Exam     Vitals:    12/08/22 1412   BP: 137/81   Pulse: 99   Resp: 18   Temp: 99.1 °F (37.3 °C)   SpO2: 100%   Weight: 98.4 kg (217 lb)   Height: 5' 8\" (1.727 m)     Physical Exam  Vitals and nursing note reviewed. Constitutional:       General: He is not in acute distress. Appearance: He is well-developed. He is not ill-appearing, toxic-appearing or diaphoretic. HENT:      Head: Normocephalic and atraumatic. Neck:      Thyroid: No thyromegaly. Vascular: No carotid bruit. Trachea: No tracheal deviation. Cardiovascular:      Rate and Rhythm: Normal rate and regular rhythm. Heart sounds: Normal heart sounds. No murmur heard. No friction rub. No gallop. Pulmonary:      Effort: Pulmonary effort is normal. No respiratory distress. Breath sounds: Normal breath sounds. No stridor. No wheezing or rales. Chest:      Chest wall: No tenderness. Abdominal:      General: There is no distension. Palpations: Abdomen is soft. There is no mass. Tenderness: There is no abdominal tenderness. There is no guarding or rebound. Musculoskeletal:         General: Normal range of motion. Cervical back: Normal range of motion and neck supple. Skin:     General: Skin is warm and dry. Coloration: Skin is not pale. Neurological:      Mental Status: He is alert. Psychiatric:         Speech: Speech normal.         Behavior: Behavior normal.         Thought Content:  Thought content normal.         Judgment: Judgment normal.          Diagnostic Study Results     Labs -     Recent Results (from the past 12 hour(s))   INFLUENZA A & B AG (RAPID TEST)    Collection Time: 12/08/22  2:19 PM   Result Value Ref Range    Influenza A Antigen Negative NEG      Influenza B Antigen Negative NEG     COVID-19 RAPID TEST    Collection Time: 12/08/22  2:19 PM   Result Value Ref Range    Specimen source Nasopharyngeal      COVID-19 rapid test Not detected     CBC WITH AUTOMATED DIFF    Collection Time: 12/08/22  4:00 PM   Result Value Ref Range    WBC 13.2 4.6 - 13.2 K/uL    RBC 4.81 4.35 - 5.65 M/uL    HGB 15.3 13.0 - 16.0 g/dL    HCT 44.3 36.0 - 48.0 %    MCV 92.1 78.0 - 100.0 FL    MCH 31.8 24.0 - 34.0 PG    MCHC 34.5 31.0 - 37.0 g/dL    RDW 11.9 11.6 - 14.5 %    PLATELET 297 650 - 114 K/uL    MPV 8.9 (L) 9.2 - 11.8 FL    NRBC 0.0 0  WBC    ABSOLUTE NRBC 0.00 0.00 - 0.01 K/uL    NEUTROPHILS 88 (H) 40 - 73 %    LYMPHOCYTES 6 (L) 21 - 52 %    MONOCYTES 5 3 - 10 %    EOSINOPHILS 0 0 - 5 %    BASOPHILS 0 0 - 2 %    IMMATURE GRANULOCYTES 1 (H) 0.0 - 0.5 %    ABS. NEUTROPHILS 11.7 (H) 1.8 - 8.0 K/UL    ABS. LYMPHOCYTES 0.8 (L) 0.9 - 3.6 K/UL    ABS. MONOCYTES 0.7 0.05 - 1.2 K/UL    ABS. EOSINOPHILS 0.0 0.0 - 0.4 K/UL    ABS. BASOPHILS 0.0 0.0 - 0.1 K/UL    ABS. IMM. GRANS. 0.1 (H) 0.00 - 0.04 K/UL    DF AUTOMATED     METABOLIC PANEL, COMPREHENSIVE    Collection Time: 12/08/22  4:00 PM   Result Value Ref Range    Sodium 131 (L) 136 - 145 mmol/L    Potassium 3.1 (L) 3.5 - 5.5 mmol/L    Chloride 94 (L) 100 - 111 mmol/L    CO2 31 21 - 32 mmol/L    Anion gap 6 3.0 - 18 mmol/L    Glucose 115 (H) 74 - 99 mg/dL    BUN 9 7.0 - 18 MG/DL    Creatinine 1.10 0.6 - 1.3 MG/DL    BUN/Creatinine ratio 8 (L) 12 - 20      eGFR >60 >60 ml/min/1.73m2    Calcium 8.8 8.5 - 10.1 MG/DL    Bilirubin, total 1.6 (H) 0.2 - 1.0 MG/DL    ALT (SGPT) 44 16 - 61 U/L    AST (SGOT) 25 10 - 38 U/L    Alk.  phosphatase 77 45 - 117 U/L    Protein, total 8.2 6.4 - 8.2 g/dL    Albumin 4.0 3.4 - 5.0 g/dL    Globulin 4.2 (H) 2.0 - 4.0 g/dL    A-G Ratio 1.0 0.8 - 1.7     TROPONIN-HIGH SENSITIVITY    Collection Time: 12/08/22  4:00 PM   Result Value Ref Range    Troponin-High Sensitivity 10 0 - 78 ng/L   POC LACTIC ACID    Collection Time: 12/08/22  4:14 PM   Result Value Ref Range    Lactic Acid (POC) 0.75 0.40 - 2.00 mmol/L   URINALYSIS W/ RFLX MICROSCOPIC    Collection Time: 12/08/22  4:15 PM   Result Value Ref Range    Color YELLOW      Appearance CLEAR      Specific gravity 1.017 1.005 - 1.030      pH (UA) 5.5 5.0 - 8.0      Protein TRACE (A) NEG mg/dL    Glucose Negative NEG mg/dL    Ketone Negative NEG mg/dL    Bilirubin Negative NEG      Blood SMALL (A) NEG      Urobilinogen 1.0 0.2 - 1.0 EU/dL    Nitrites Negative NEG      Leukocyte Esterase Negative NEG     URINE MICROSCOPIC ONLY    Collection Time: 12/08/22  4:15 PM   Result Value Ref Range    RBC 0 to 3 0 - 5 /hpf       Radiologic Studies -   XR CHEST PA LAT   Final Result   1. Possible trace right pleural effusion. Otherwise, no radiographic evidence   of acute cardiopulmonary process. 2.  A 10 mm well defined nodular density at the lateral left lung base, favored   to reflect a nipple shadow. Repeat radiograph with nipple markers could be   considered to exclude pulmonary nodule. CT Results  (Last 48 hours)      None          CXR Results  (Last 48 hours)                 12/08/22 1607  XR CHEST PA LAT Final result    Impression:  1. Possible trace right pleural effusion. Otherwise, no radiographic evidence   of acute cardiopulmonary process. 2.  A 10 mm well defined nodular density at the lateral left lung base, favored   to reflect a nipple shadow. Repeat radiograph with nipple markers could be   considered to exclude pulmonary nodule. Narrative:  EXAM: XR CHEST PA LAT       CLINICAL INDICATION/HISTORY: 61 years Male. fever.    Additional History: None       TECHNIQUE: Frontal and lateral views of the chest       COMPARISON: No comparison study is available at the time of this dictation. FINDINGS:        The cardiac silhouette appears within normal limits. Pulmonary vasculature   appears within normal limits. No confluent airspace opacity is appreciated. Well-defined rounded density at   the lateral left lung base, favor nipple shadow. No definite pneumothorax. Slight blunting of the right posterior sulcus which   may reflect a trace right pleural effusion. No evidence of left pleural   effusion. No acute osseous abnormality appreciated. Medical Decision Making   I am the first provider for this patient. I reviewed the vital signs, available nursing notes, past medical history, past surgical history, family history and social history. Vital Signs-Reviewed the patient's vital signs. Records Reviewed: Nursing Notes    Procedures:  Procedures    Provider Notes (Medical Decision Making): Patient has nausea without vomiting no reproducible abdominal pain chest pain shortness of back neck or head head pain dysuria rectal pain. Patient just feels chilled and despite that he is still taking Cipro for his antibiotic from a foodborne illness on Thanksgiving presumably from the oysters. Blood cultures were obtained. Patient advised to follow-up with his primary care provider for any concerns. Flu and COVID swabs negative. Zofran sent to preferred pharmacy. MED RECONCILIATION:  No current facility-administered medications for this encounter. Current Outpatient Medications   Medication Sig    ondansetron hcl (Zofran) 4 mg tablet Take 2 Tablets by mouth every eight (8) hours as needed for Nausea. losartan-hydroCHLOROthiazide (HYZAAR) 100-25 mg per tablet Take 1 Tablet by mouth daily. atorvastatin (LIPITOR) 20 mg tablet Take 1 Tablet by mouth daily. amLODIPine (NORVASC) 5 mg tablet Take 1 Tablet by mouth daily.     testosterone cypionate (DEPOTESTOTERONE CYPIONATE) 200 mg/mL injection INJECT 200 MG IN THE MUSCLE EVERY 4 WEEKS (Patient taking differently: 200 mg by IntraMUSCular route every thirty (30) days. INJECT 200 MG IN THE MUSCLE EVERY 3 WEEKS)    omeprazole (PRILOSEC OTC) 20 mg tablet Take 20 mg by mouth daily. gabapentin (Neurontin) 100 mg capsule Take 1 cap by mouth at night for 1 week, then increase to 2 as directed. (Patient not taking: Reported on 11/4/2020)    diclofenac EC (VOLTAREN) 75 mg EC tablet Take 1 Tab by mouth two (2) times daily (with meals). (Patient not taking: Reported on 2/1/2022)    tadalafil (CIALIS) 20 mg tablet Take 1 Tab by mouth once over twenty-four (24) hours. (Patient not taking: Reported on 6/11/2021)    Syringe, Disposable, 1 mL syrg Inject testosterone as directed. Safety Needles (NEEDLE, DISP, 18 G) 18 x 1 1/2 \" ndle Use as directed to inject testosterone. Disposition:  home    DISCHARGE NOTE:   5:26 PM    Pt has been reexamined. Patient has no new complaints, changes, or physical findings. Care plan outlined and precautions discussed. Results of labs, CXR were reviewed with the patient. All medications were reviewed with the patient; will discharge home w/zofran. All of pt's questions and concerns were addressed. Patient was instructed and agrees to follow up with PCP, as well as to return to the ED upon further deterioration. Patient is ready to go home. Follow-up Information       Follow up With Specialties Details [facility]  Schedule an appointment as soon as possible for a visit in 2 days  [phone]  [phone] [address] [facility] EMERGENCY DEPT Emergency Medicine  If symptoms worsen return immediately [address] [facility]  [phone]            Current Discharge Medication List        START taking these medications    Details   ondansetron hcl (Zofran) 4 mg tablet Take 2 Tablets by mouth every eight (8) hours as needed for Nausea.   Qty: 12 Tablet, Refills: 0  Start date: 12/8/2022             Diagnosis     Clinical Impression:   1. Chills    2.  Nausea without vomiting

## 2022-12-08 NOTE — ED TRIAGE NOTES
Pt is ambulatory to triage, presents with complaints of nausea, headache, body aches, and fever since yesterday. States approx 10 days ago had nausea, diarrhea, and fever and was seen at [facility], given abx, and felt better over the weekend.

## 2022-12-11 LAB
BACTERIA SPEC CULT: NORMAL
BACTERIA SPEC CULT: NORMAL
SERVICE CMNT-IMP: NORMAL
SERVICE CMNT-IMP: NORMAL

## 2023-02-21 ENCOUNTER — OFFICE VISIT (OUTPATIENT)
Age: 68
End: 2023-02-21
Payer: MEDICARE

## 2023-02-21 VITALS
SYSTOLIC BLOOD PRESSURE: 136 MMHG | HEIGHT: 68 IN | OXYGEN SATURATION: 95 % | WEIGHT: 220 LBS | BODY MASS INDEX: 33.34 KG/M2 | DIASTOLIC BLOOD PRESSURE: 82 MMHG | HEART RATE: 82 BPM

## 2023-02-21 DIAGNOSIS — E78.00 HYPERCHOLESTEREMIA: ICD-10-CM

## 2023-02-21 DIAGNOSIS — I10 ESSENTIAL (PRIMARY) HYPERTENSION: Primary | ICD-10-CM

## 2023-02-21 DIAGNOSIS — I77.810 AORTIC ECTASIA, THORACIC (HCC): ICD-10-CM

## 2023-02-21 PROCEDURE — G8484 FLU IMMUNIZE NO ADMIN: HCPCS | Performed by: INTERNAL MEDICINE

## 2023-02-21 PROCEDURE — 4004F PT TOBACCO SCREEN RCVD TLK: CPT | Performed by: INTERNAL MEDICINE

## 2023-02-21 PROCEDURE — 99214 OFFICE O/P EST MOD 30 MIN: CPT | Performed by: INTERNAL MEDICINE

## 2023-02-21 PROCEDURE — G8417 CALC BMI ABV UP PARAM F/U: HCPCS | Performed by: INTERNAL MEDICINE

## 2023-02-21 PROCEDURE — 3017F COLORECTAL CA SCREEN DOC REV: CPT | Performed by: INTERNAL MEDICINE

## 2023-02-21 PROCEDURE — 1123F ACP DISCUSS/DSCN MKR DOCD: CPT | Performed by: INTERNAL MEDICINE

## 2023-02-21 PROCEDURE — 3075F SYST BP GE 130 - 139MM HG: CPT | Performed by: INTERNAL MEDICINE

## 2023-02-21 PROCEDURE — 3079F DIAST BP 80-89 MM HG: CPT | Performed by: INTERNAL MEDICINE

## 2023-02-21 PROCEDURE — G8427 DOCREV CUR MEDS BY ELIG CLIN: HCPCS | Performed by: INTERNAL MEDICINE

## 2023-02-21 ASSESSMENT — ENCOUNTER SYMPTOMS
EYES NEGATIVE: 1
RESPIRATORY NEGATIVE: 1
GASTROINTESTINAL NEGATIVE: 1

## 2023-02-21 NOTE — PROGRESS NOTES
Pura Wilde is a 74y.o. year old male. Follow-up of hypertension, hyperlipidemia, obesity  History of venous reflux    2/23 history of venous ablation attempt in 11/22 with mild improvement. No significant chest pain, shortness of breath, dizziness, palpitations or loss of consciousness. Edema is easily controlled with knee-high compression stocking. Review of Systems   Constitutional: Negative. HENT: Negative. Eyes: Negative. Respiratory: Negative. Cardiovascular: Negative. Gastrointestinal: Negative. Endocrine: Negative. Genitourinary: Negative. Musculoskeletal: Negative. Neurological: Negative. Psychiatric/Behavioral: Negative. All other systems reviewed and are negative. Not on File    Past Medical History:   Diagnosis Date    ACL (anterior cruciate ligament) tear 8/10/2010    Carpal tunnel syndrome, bilateral 5/13/2015    GERD (gastroesophageal reflux disease) 8/10/2010    HLD (hyperlipidemia) 8/10/2010    Knee pain, right 8/10/2010    Squamous cell carcinoma 12/13/2011    face    Venous reflux        Family History   Problem Relation Age of Onset    Crohn's Disease Sister     Cancer Father         prostate in [de-identified]    Heart Disease Father         CAD    Rheum Arthritis Father     High Cholesterol Mother     High Cholesterol Father        Social History     Tobacco Use    Smoking status: Some Days    Smokeless tobacco: Never   Substance Use Topics    Alcohol use: Not Currently    Drug use: No        Current Outpatient Medications   Medication Sig Dispense Refill    amLODIPine (NORVASC) 5 MG tablet Take 5 mg by mouth daily      atorvastatin (LIPITOR) 20 MG tablet Take 20 mg by mouth daily      diclofenac (VOLTAREN) 75 MG EC tablet Take 75 mg by mouth as needed      gabapentin (NEURONTIN) 100 MG capsule daily as needed.       losartan-hydroCHLOROthiazide (HYZAAR) 100-25 MG per tablet Take 1 tablet by mouth daily      omeprazole (PRILOSEC OTC) 20 MG tablet Take 20 mg by mouth daily      ondansetron (ZOFRAN) 4 MG tablet Take 8 mg by mouth every 8 hours as needed      tadalafil (CIALIS) 20 MG tablet Take 20 mg by mouth as needed      testosterone cypionate (DEPOTESTOTERONE CYPIONATE) 200 MG/ML injection Once every 3 weeks       No current facility-administered medications for this visit. Past Surgical History:   Procedure Laterality Date    BIOPSY  2011    face - squamous cell     COLONOSCOPY  2008    f/u 2018    ORTHOPEDIC SURGERY  11or10 of 2008    right hand        Vitals:    02/21/23 0914   BP: 136/82   Pulse: 82   SpO2: 95%   Weight: 220 lb (99.8 kg)   Height: 5' 8\" (1.727 m)          Diagnostic Studies:  I have reviewed the relevant tests done on the patient and show as follows  EKG tracings reviewed by me today. No results found for this or any previous visit (from the past 4464 hour(s)). Xray Result (most recent):  XR CHEST STANDARD TWO  12/08/2022    Narrative  EXAM: XR CHEST PA LAT    CLINICAL INDICATION/HISTORY: 61 years Male. fever. Additional History: None    TECHNIQUE: Frontal and lateral views of the chest    COMPARISON: No comparison study is available at the time of this dictation. FINDINGS:    The cardiac silhouette appears within normal limits. Pulmonary vasculature  appears within normal limits. No confluent airspace opacity is appreciated. Well-defined rounded density at  the lateral left lung base, favor nipple shadow. No definite pneumothorax. Slight blunting of the right posterior sulcus which  may reflect a trace right pleural effusion. No evidence of left pleural  effusion. No acute osseous abnormality appreciated. Impression  1. Possible trace right pleural effusion. Otherwise, no radiographic evidence  of acute cardiopulmonary process. 2.  A 10 mm well defined nodular density at the lateral left lung base, favored  to reflect a nipple shadow.  Repeat radiograph with nipple markers could be  considered to exclude pulmonary nodule. 09/10/21    TRANSTHORACIC ECHOCARDIOGRAM (TTE) COMPLETE (CONTRAST/BUBBLE/3D PRN) 09/13/2021  8:52 AM, 09/13/2021 12:00 AM (Final)    Narrative  This is a summary report. The complete report is available in the patient's medical record. If you cannot access the medical record, please contact the sending organization for a detailed fax or copy. · LV: Estimated LVEF is 60 - 65%. Normal cavity size and systolic function (ejection fraction normal). Mild concentric hypertrophy. Wall motion: normal. Mild (grade 1) left ventricular diastolic dysfunction. · LA: Left Atrium volume index is 28.11 mL/m2. · TV: Mild tricuspid valve regurgitation is present. Right Ventricular Arterial Pressure (RVSP) is 27 mmHg. Pulmonary hypertension not suggested by Doppler findings. · PV: Mild pulmonic valve regurgitation is present. · AO: Mild aortic root dilatation. Signed by: Leigha Castrejon MD on 9/13/2021  8:52 AM, Signed by: Unknown Provider Result on 9/13/2021 12:00 AM    No results found for this or any previous visit. No results found for this or any previous visit. Mr. Belinda Martinez has a reminder for a \"due or due soon\" health maintenance. I have asked that he contact his primary care provider for follow-up on this health maintenance. Physical Exam  Vitals and nursing note reviewed. Constitutional:       Appearance: Normal appearance. HENT:      Head: Normocephalic and atraumatic. Nose: Nose normal.   Eyes:      Conjunctiva/sclera: Conjunctivae normal.   Cardiovascular:      Rate and Rhythm: Normal rate and regular rhythm. Pulses: Normal pulses. Heart sounds: Normal heart sounds. Pulmonary:      Effort: Pulmonary effort is normal.      Breath sounds: Normal breath sounds. Abdominal:      General: Bowel sounds are normal.      Palpations: Abdomen is soft. Musculoskeletal:         General: Normal range of motion.       Comments: Right leg varicose veins Skin:     General: Skin is warm and dry. Neurological:      General: No focal deficit present. Mental Status: He is alert and oriented to person, place, and time. Psychiatric:         Mood and Affect: Mood normal.         Behavior: Behavior normal.      ASSESSMENT & PLAN    Lab Results   Component Value Date    CHOL 215 (H) 06/17/2019     Lab Results   Component Value Date    TRIG 219 (H) 06/17/2019     Lab Results   Component Value Date    HDL 51 06/17/2019    HDL 4.2 06/17/2019     Lab Results   Component Value Date    LDLCALC 120 (H) 06/17/2019     Lab Results   Component Value Date    LABVLDL 44 (H) 06/17/2019     No results found for: CHOLHDLRATIO  Aortic ectasia: 9/21 ao root 3.9 cm;    1. Short resolved 1   current treatment plan is effective, no change in therapy  reviewed diet, exercise and weight control  cardiovascular risk and specific lipid/LDL goals reviewed. Coronary calcium scoring in 2018-  Was 1    Seen for follow-up. Currently on losartan/hydrochlorthiazide 100/25 mg daily and Norvasc 5 mg.   bp well controlled on current meds. Uses compression stocking for venous insufficiency. ET good with no chest pain or sob. Continue current meds and advised weight reduction. F/u in 1 yr    Nini Shaw was seen today for follow-up. Diagnoses and all orders for this visit:    Essential (primary) hypertension  -     EKG 12 Lead  -     Hepatic Function Panel; Future  -     Basic Metabolic Panel; Future    Hypercholesteremia  -     Hepatic Function Panel; Future  -     Lipid Panel; Future    Aortic ectasia, thoracic (HCC)  -     Transthoracic echocardiogram (TTE) complete with contrast, bubble, strain, and 3D PRN; Future        Pertinent laboratory and test data reviewed and discussed with patient. See patient instructions also for other medical advice given    There are no discontinued medications.     Follow-up and Dispositions    Return in about 3 months (around 5/21/2023), or if symptoms worsen or fail to improve, for post test.           Return to ER if any significant CP not relieved by s/l NTG, severe SOB, severe palpitations, loss of consciousness    2/21/2023 blood pressure is controlled. Lipids need to be followed and were ordered. Patient had aortic ectasia by old echo which will be repeated to follow-up on the aorta. Healthy lifestyle discussed and Mediterranean diet guidelines given.

## 2023-02-21 NOTE — PROGRESS NOTES
1. Have you been to the ER, urgent care clinic since your last visit? Hospitalized since your last visit? Yes When: 12/2022 Where: [facility] Reason for visit: [place]      2. Where do you normally have your labs drawn? pcp    3. Do you need any refills today?   no    4. Which local pharmacy do you use (enter pharmacy)? [address]    5. Which mail order pharmacy do you use (enter pharmacy)?   no     6. Are you here for surgical clearance and if so who will be doing your     procedure/surgery (care team)?    no

## 2023-09-25 RX ORDER — LOSARTAN POTASSIUM AND HYDROCHLOROTHIAZIDE 25; 100 MG/1; MG/1
1 TABLET ORAL DAILY
Qty: 90 TABLET | Refills: 3 | Status: SHIPPED | OUTPATIENT
Start: 2023-09-25

## 2023-11-13 RX ORDER — ATORVASTATIN CALCIUM 20 MG/1
20 TABLET, FILM COATED ORAL DAILY
Qty: 90 TABLET | Refills: 1 | Status: SHIPPED | OUTPATIENT
Start: 2023-11-13

## 2023-11-14 ENCOUNTER — TELEPHONE (OUTPATIENT)
Age: 68
End: 2023-11-14

## 2023-11-14 NOTE — TELEPHONE ENCOUNTER
Patient made aware refill for Lipitor sent to pharmacy. Patient was last seen 2/21/23 was due to return to office 4/2023 and no showed to appt. Needs to have an appointment if we are going to issue any future refills. Pt. declined scheduling a follow-up appointment at this time. Moving fwd pt. will need to have PCP refill medications until pt.  Is seen in office

## 2024-12-23 RX ORDER — LOSARTAN POTASSIUM AND HYDROCHLOROTHIAZIDE 25; 100 MG/1; MG/1
1 TABLET ORAL DAILY
Qty: 90 TABLET | Refills: 3 | OUTPATIENT
Start: 2024-12-23